# Patient Record
Sex: MALE | Race: WHITE | Employment: PART TIME | URBAN - METROPOLITAN AREA
[De-identification: names, ages, dates, MRNs, and addresses within clinical notes are randomized per-mention and may not be internally consistent; named-entity substitution may affect disease eponyms.]

---

## 2020-10-19 ENCOUNTER — NURSE TRIAGE (OUTPATIENT)
Dept: NURSING | Facility: CLINIC | Age: 45
End: 2020-10-19

## 2020-10-19 ENCOUNTER — HOSPITAL ENCOUNTER (EMERGENCY)
Facility: CLINIC | Age: 45
Discharge: HOME | End: 2020-10-19
Attending: EMERGENCY MEDICINE | Admitting: EMERGENCY MEDICINE
Payer: MEDICARE

## 2020-10-19 ENCOUNTER — APPOINTMENT (OUTPATIENT)
Dept: ULTRASOUND IMAGING | Facility: CLINIC | Age: 45
End: 2020-10-19
Attending: EMERGENCY MEDICINE
Payer: MEDICARE

## 2020-10-19 VITALS
HEART RATE: 74 BPM | DIASTOLIC BLOOD PRESSURE: 92 MMHG | RESPIRATION RATE: 16 BRPM | SYSTOLIC BLOOD PRESSURE: 152 MMHG | OXYGEN SATURATION: 97 % | TEMPERATURE: 99.7 F

## 2020-10-19 DIAGNOSIS — N43.3 HYDROCELE, LEFT: ICD-10-CM

## 2020-10-19 LAB
ALBUMIN SERPL-MCNC: 3.9 G/DL (ref 3.4–5)
ALP SERPL-CCNC: 78 U/L (ref 40–150)
ALT SERPL W P-5'-P-CCNC: 30 U/L (ref 0–70)
ANION GAP SERPL CALCULATED.3IONS-SCNC: 4 MMOL/L (ref 3–14)
AST SERPL W P-5'-P-CCNC: 24 U/L (ref 0–45)
BASOPHILS # BLD AUTO: 0 10E9/L (ref 0–0.2)
BASOPHILS NFR BLD AUTO: 0.8 %
BILIRUB SERPL-MCNC: 0.3 MG/DL (ref 0.2–1.3)
BUN SERPL-MCNC: 10 MG/DL (ref 7–30)
CALCIUM SERPL-MCNC: 8.5 MG/DL (ref 8.5–10.1)
CHLORIDE SERPL-SCNC: 105 MMOL/L (ref 94–109)
CO2 SERPL-SCNC: 30 MMOL/L (ref 20–32)
CREAT SERPL-MCNC: 0.71 MG/DL (ref 0.66–1.25)
DIFFERENTIAL METHOD BLD: ABNORMAL
EOSINOPHIL # BLD AUTO: 0.1 10E9/L (ref 0–0.7)
EOSINOPHIL NFR BLD AUTO: 1.4 %
ERYTHROCYTE [DISTWIDTH] IN BLOOD BY AUTOMATED COUNT: 13.2 % (ref 10–15)
GFR SERPL CREATININE-BSD FRML MDRD: >90 ML/MIN/{1.73_M2}
GLUCOSE SERPL-MCNC: 102 MG/DL (ref 70–99)
HCT VFR BLD AUTO: 40.1 % (ref 40–53)
HGB BLD-MCNC: 12.3 G/DL (ref 13.3–17.7)
IMM GRANULOCYTES # BLD: 0 10E9/L (ref 0–0.4)
IMM GRANULOCYTES NFR BLD: 0.4 %
LYMPHOCYTES # BLD AUTO: 0.8 10E9/L (ref 0.8–5.3)
LYMPHOCYTES NFR BLD AUTO: 15.3 %
MCH RBC QN AUTO: 25.6 PG (ref 26.5–33)
MCHC RBC AUTO-ENTMCNC: 30.7 G/DL (ref 31.5–36.5)
MCV RBC AUTO: 83 FL (ref 78–100)
MONOCYTES # BLD AUTO: 0.3 10E9/L (ref 0–1.3)
MONOCYTES NFR BLD AUTO: 5.6 %
NEUTROPHILS # BLD AUTO: 4 10E9/L (ref 1.6–8.3)
NEUTROPHILS NFR BLD AUTO: 76.5 %
NRBC # BLD AUTO: 0 10*3/UL
NRBC BLD AUTO-RTO: 0 /100
PLATELET # BLD AUTO: 304 10E9/L (ref 150–450)
POTASSIUM SERPL-SCNC: 3.7 MMOL/L (ref 3.4–5.3)
PROT SERPL-MCNC: 7.9 G/DL (ref 6.8–8.8)
RBC # BLD AUTO: 4.81 10E12/L (ref 4.4–5.9)
SODIUM SERPL-SCNC: 139 MMOL/L (ref 133–144)
WBC # BLD AUTO: 5.2 10E9/L (ref 4–11)

## 2020-10-19 PROCEDURE — 76870 US EXAM SCROTUM: CPT

## 2020-10-19 PROCEDURE — 99284 EMERGENCY DEPT VISIT MOD MDM: CPT | Mod: 25

## 2020-10-19 PROCEDURE — 81001 URINALYSIS AUTO W/SCOPE: CPT | Performed by: EMERGENCY MEDICINE

## 2020-10-19 PROCEDURE — 85025 COMPLETE CBC W/AUTO DIFF WBC: CPT | Performed by: EMERGENCY MEDICINE

## 2020-10-19 PROCEDURE — 80053 COMPREHEN METABOLIC PANEL: CPT | Performed by: EMERGENCY MEDICINE

## 2020-10-19 PROCEDURE — 36415 COLL VENOUS BLD VENIPUNCTURE: CPT | Performed by: EMERGENCY MEDICINE

## 2020-10-19 ASSESSMENT — ENCOUNTER SYMPTOMS
DYSURIA: 0
FEVER: 0

## 2020-10-19 NOTE — TELEPHONE ENCOUNTER
RN triage  Testicle swollen for most of the year this year. Lives in [place] and is here in [place] visiting  One is normal in size but the other is the size of an orange  No sores or drainage, skin is some red and irritated, chafed like for the past week  No trouble with urination    Not really any pain but it does come and go  No fever    Gave disposition to be seen in ED now, as soon as his friend can take him  Patient stated understanding  Thais Knott RN  [facility] Nurse Advisor    Reason for Disposition    Scrotum is painful or tender to touch     Swelling to the size of an orange in one testicle.  Disposition to ED now, friend will drive him    Additional Information    Negative: Pain or burning with passing urine (urination) is main symptom    Negative: Pubic lice suspected    Negative: STD exposure and prevention, question about    Negative: Pain in scrotum or testicle is main symptom    Swollen scrotum OR lump in the scrotum/groin area    Negative: Rash or color change of scrotum BUT no swelling or pain    Negative: Swelling followed a genital injury    Negative: Pain in scrotum is main symptom    Protocols used: SCROTUM SWELLING-A-OH, PENIS AND SCROTUM SYMPTOMS-A-OH

## 2020-10-19 NOTE — ED TRIAGE NOTES
"Pt presents with right sided testicular swelling and pain that has been ongoing for a year, pt denies any recent changes to it stating that today he \"figured he'd just get it taken care of.\" Pt alert, oriented x3 ABCs intact  "

## 2020-10-19 NOTE — ED PROVIDER NOTES
History   Chief Complaint  testicular Swelling    The history is provided by the patient.      Anthony Chappell is a 45 year old male who presents for evaluation of right sided testicular swelling for the past year that has progressively increased in size. In the last couple of weeks he noticed some changes to the skin over the area. Although, the area is not painful. He denies any dysuria, fevers, or other infectious symptoms.  He has not seen anyone for the swelling.     Allergies  No Known Allergies    Medications  The patient is not currently taking any prescribed medications.    Past Medical History  History reviewed. No pertinent past medical history.     Past Surgical History   History reviewed.  No pertinent past surgical history.    Family History  History reviewed. No pertinent family history.    Social History  The patient was unaccompanied to the ED.    Review of Systems   Constitutional: Negative for fever.   Genitourinary: Negative for dysuria and testicular pain.        Positive for testicular swelling   All other systems reviewed and are negative.      Physical Exam     Patient Vitals for the past 24 hrs:   BP Temp Temp src Pulse Resp SpO2   10/19/20 1700 (!) 152/92 -- -- 74 -- --   10/19/20 1645 (!) 151/91 -- -- 74 -- 97 %   10/19/20 1640 -- -- -- -- -- 98 %   10/19/20 1635 -- -- -- -- -- 97 %   10/19/20 1630 (!) 154/96 -- -- 75 -- 97 %   10/19/20 1610 -- -- -- -- -- 94 %   10/19/20 1605 -- -- -- -- -- 97 %   10/19/20 1540 (!) 166/105 -- -- 75 -- --   10/19/20 1359 (!) 150/107 -- -- -- -- --   10/19/20 1358 -- 99.7  F (37.6  C) Temporal 96 16 94 %       Physical Exam  Constitutional:       Appearance: He is well-developed.   HENT:      Right Ear: External ear normal.      Left Ear: External ear normal.      Mouth/Throat:      Mouth: Mucous membranes are moist.      Pharynx: Oropharynx is clear. No oropharyngeal exudate.   Eyes:      General: No scleral icterus.     Conjunctiva/sclera: Conjunctivae  normal.      Pupils: Pupils are equal, round, and reactive to light.   Cardiovascular:      Rate and Rhythm: Normal rate and regular rhythm.      Heart sounds: Normal heart sounds. No murmur. No friction rub. No gallop.    Pulmonary:      Effort: Pulmonary effort is normal. No respiratory distress.      Breath sounds: Normal breath sounds. No wheezing or rales.   Abdominal:      General: Bowel sounds are normal. There is no distension.      Palpations: Abdomen is soft. There is no mass.      Tenderness: There is no abdominal tenderness.   Genitourinary:     Comments: Grossly enlarged right scrotal mass without tenderness. No overlying skin erythema. There is dry flaky skin overlying the mass. Left side WNL.  Musculoskeletal: Normal range of motion.   Skin:     General: Skin is warm and dry.      Findings: No rash.   Neurological:      Mental Status: He is alert.               Emergency Department Course   Imaging:  Radiology findings were communicated with the patient who voiced understanding of the findings.    US testicular and scrotum w doppler ltd:  IMPRESSION:  1.  Large right scrotal hydrocele that appears simple.  2.  Small left scrotal hydrocele.  3.  No testicular mass or acute testicular abnormality.  Readings per Radiology    Laboratory:  Laboratory findings were communicated with the patient who voiced understanding of the findings.    CBC: HGB 12.3 (L) o/w WNL (WBC 5.12, )  CMP: glucose 102 (H) o/w WNL (Creatinine 0.71)    UA with Microscopic: pH 7.5 (H) o/w WNL    Emergency Department Course:  Past medical records, nursing notes, and vitals reviewed.    1434 I physically examined the patient as documented above.    IV was inserted and blood was drawn for laboratory testing, results above.    The patient provided a urine sample here in the emergency department. This was sent for laboratory testing, findings above.    The patient was sent for radiographs while in the emergency department, results  above.     1629 I consulted with Dr. Ambriz of urology.     1700 I rechecked the patient and discussed the findings of their workup thus far.     Findings and plan explained to the Patient. Patient discharged home with instructions regarding supportive care, medications, and reasons to return. The importance of close follow-up was reviewed.     I personally reviewed the laboratory and imaging results with the Patient and answered all related questions prior to discharge.     Impression & Plan   Medical Decision Making:  Anthony Chappell presents with an enlarged right testicle that has been like that for a year. Patient has mild tenderness but no evidence of any abdominal pain, no drainage, and no other signs of infection. Labs and ultrasound were obtained and most likely this is a large hydrocele. I talked to Dr. Ambriz of urology and they will be getting him in this week to deal with this as it is starting to puss, show skin changes, and the size does need to be dealt with. Patient is comfortable with the plan. Patient will follow-up with Dr. Ambriz as indicated.     Diagnosis:    ICD-10-CM    1. Hydrocele, left  N43.3        Disposition:  Discharged to home.    Scribe Disclosure:  I, Elly Rosario, am serving as a scribe at 2:33 PM on 10/19/2020 to document services personally performed by Evan Rogers MD based on my observations and the provider's statements to me.      Evan Rogers MD  10/19/20 0592

## 2020-10-19 NOTE — ED AVS SNAPSHOT
[facility] Emergency Dept  [address]  [facility] [address]  Phone: [phone]  Fax: [phone]                                    Anthony Chappell   MRN: [id number]    Department: [facility] Emergency Dept   Date of Visit: 10/19/2020           After Visit Summary Signature Page    I have received my discharge instructions, and my questions have been answered. I have discussed any challenges I see with this plan with the nurse or doctor.    ..........................................................................................................................................  Patient/Patient Representative Signature      ..........................................................................................................................................  Patient Representative Print Name and Relationship to Patient    ..................................................               ................................................  Date                                   Time    ..........................................................................................................................................  Reviewed by Signature/Title    ...................................................              ..............................................  Date                                               Time          22EPIC Rev 08/18

## 2020-10-20 LAB
ALBUMIN UR-MCNC: NEGATIVE MG/DL
APPEARANCE UR: ABNORMAL
BILIRUB UR QL STRIP: NEGATIVE
COLOR UR AUTO: ABNORMAL
GLUCOSE UR STRIP-MCNC: NEGATIVE MG/DL
HGB UR QL STRIP: NEGATIVE
KETONES UR STRIP-MCNC: NEGATIVE MG/DL
LEUKOCYTE ESTERASE UR QL STRIP: NEGATIVE
MUCOUS THREADS #/AREA URNS LPF: PRESENT /LPF
NITRATE UR QL: NEGATIVE
PH UR STRIP: 7.5 PH (ref 5–7)
RBC #/AREA URNS AUTO: 1 /HPF (ref 0–2)
SOURCE: ABNORMAL
SP GR UR STRIP: 1.02 (ref 1–1.03)
UROBILINOGEN UR STRIP-MCNC: NORMAL MG/DL (ref 0–2)
WBC #/AREA URNS AUTO: <1 /HPF (ref 0–5)

## 2020-11-05 ENCOUNTER — OFFICE VISIT (OUTPATIENT)
Dept: INTERNAL MEDICINE | Facility: CLINIC | Age: 45
End: 2020-11-05
Payer: MEDICARE

## 2020-11-05 VITALS
TEMPERATURE: 98 F | HEART RATE: 91 BPM | RESPIRATION RATE: 16 BRPM | DIASTOLIC BLOOD PRESSURE: 108 MMHG | OXYGEN SATURATION: 98 % | SYSTOLIC BLOOD PRESSURE: 148 MMHG | HEIGHT: 70 IN | WEIGHT: 259.8 LBS | BODY MASS INDEX: 37.19 KG/M2

## 2020-11-05 DIAGNOSIS — Q74.3 ARTHROGRYPOSIS MULTIPLEX CONGENITA: ICD-10-CM

## 2020-11-05 DIAGNOSIS — F19.20 CHEMICAL DEPENDENCY (H): Primary | ICD-10-CM

## 2020-11-05 DIAGNOSIS — I10 ESSENTIAL HYPERTENSION: ICD-10-CM

## 2020-11-05 LAB
ERYTHROCYTE [DISTWIDTH] IN BLOOD BY AUTOMATED COUNT: 13.9 % (ref 10–15)
HCT VFR BLD AUTO: 39.3 % (ref 40–53)
HGB BLD-MCNC: 12.2 G/DL (ref 13.3–17.7)
MCH RBC QN AUTO: 25.3 PG (ref 26.5–33)
MCHC RBC AUTO-ENTMCNC: 31 G/DL (ref 31.5–36.5)
MCV RBC AUTO: 81 FL (ref 78–100)
PLATELET # BLD AUTO: 282 10E9/L (ref 150–450)
RBC # BLD AUTO: 4.83 10E12/L (ref 4.4–5.9)
WBC # BLD AUTO: 4.5 10E9/L (ref 4–11)

## 2020-11-05 PROCEDURE — 85027 COMPLETE CBC AUTOMATED: CPT | Performed by: NURSE PRACTITIONER

## 2020-11-05 PROCEDURE — 80048 BASIC METABOLIC PNL TOTAL CA: CPT | Performed by: NURSE PRACTITIONER

## 2020-11-05 PROCEDURE — 99203 OFFICE O/P NEW LOW 30 MIN: CPT | Performed by: NURSE PRACTITIONER

## 2020-11-05 RX ORDER — LISINOPRIL 20 MG/1
20 TABLET ORAL DAILY
Qty: 90 TABLET | Refills: 1 | Status: SHIPPED | OUTPATIENT
Start: 2020-11-05

## 2020-11-05 ASSESSMENT — MIFFLIN-ST. JEOR: SCORE: 2074.7

## 2020-11-05 NOTE — LETTER
November 10, 2020      Anthony M Ta  [address]  [facility] [address]        Dear ,    We are writing to inform you of your test results.    Your glucose is elevated at 107 (normal <100) but not high enough to make you a diabetic. But it does indicate you are at high risk for developing diabetes mellitus. Exercise, avoiding simple carbohydrates in your diet and wt loss will all help to lower this risk. Please work on these as you can and I recommend rechecking fasting blood sugar in 6 months.  Your have mild anemia, I recommend a daily multivitamin with iron.     Resulted Orders   CBC with platelets   Result Value Ref Range    WBC 4.5 4.0 - 11.0 10e9/L    RBC Count 4.83 4.4 - 5.9 10e12/L    Hemoglobin 12.2 (L) 13.3 - 17.7 g/dL    Hematocrit 39.3 (L) 40.0 - 53.0 %    MCV 81 78 - 100 fl    MCH 25.3 (L) 26.5 - 33.0 pg    MCHC 31.0 (L) 31.5 - 36.5 g/dL      Comment:      Reviewed: OK with previous    RDW 13.9 10.0 - 15.0 %    Platelet Count 282 150 - 450 10e9/L   Basic metabolic panel   Result Value Ref Range    Sodium 139 133 - 144 mmol/L    Potassium 3.4 3.4 - 5.3 mmol/L    Chloride 106 94 - 109 mmol/L    Carbon Dioxide 27 20 - 32 mmol/L    Anion Gap 6 3 - 14 mmol/L    Glucose 107 (H) 70 - 99 mg/dL      Comment:      Non Fasting    Urea Nitrogen 9 7 - 30 mg/dL    Creatinine 0.61 (L) 0.66 - 1.25 mg/dL    GFR Estimate >90 >60 mL/min/[1.73_m2]      Comment:      Non  GFR Calc  Starting 12/18/2018, serum creatinine based estimated GFR (eGFR) will be   calculated using the Chronic Kidney Disease Epidemiology Collaboration   (CKD-EPI) equation.      GFR Estimate If Black >90 >60 mL/min/[1.73_m2]      Comment:       GFR Calc  Starting 12/18/2018, serum creatinine based estimated GFR (eGFR) will be   calculated using the Chronic Kidney Disease Epidemiology Collaboration   (CKD-EPI) equation.      Calcium 8.8 8.5 - 10.1 mg/dL       If you have any questions or concerns,  please call the clinic at the number listed above.       Sincerely,        Earlene Domingo NP

## 2020-11-05 NOTE — PROGRESS NOTES
"Subjective     Anthony Chappell is a 45 year old male who presents to clinic today for the following health issues:    HPI         New Patient/Transfer of Care    Patient Active Problem List   Diagnosis     Arthrogryposis multiplex congenita     Chemical dependency (H)     Essential hypertension     Current Outpatient Medications   Medication     lisinopril (ZESTRIL) 20 MG tablet     No current facility-administered medications for this visit.      Pt is interested in CD evaluation and treatment.  Currently daily use heroin         Review of Systems   Constitutional, HEENT, cardiovascular, pulmonary, gi and gu systems are negative, except as otherwise noted.      Objective    BP (!) 148/108 (BP Location: Right arm, Patient Position: Sitting, Cuff Size: Adult Regular)   Pulse 91   Temp 98  F (36.7  C) (Oral)   Resp 16   Ht 1.778 m (5' 10\")   Wt 117.8 kg (259 lb 12.8 oz)   SpO2 98%   BMI 37.28 kg/m    Body mass index is 37.28 kg/m .  Physical Exam   GENERAL: alert, no distress and physical deformities all limbs            Assessment & Plan     Chemical dependency (H)    - MENTAL HEALTH REFERRAL  - Adult; Assessments and Testing; MH/CD Assessment Center - Assess & Treat; Mental and Chemical Health Evaluation - determine appropriate level of care and admit to program; FV: [facility] [phone]; We will contac...    Essential hypertension    - lisinopril (ZESTRIL) 20 MG tablet; Take 1 tablet (20 mg) by mouth daily  - CBC with platelets  - Basic metabolic panel    Arthrogryposis multiplex congenita         BMI:   Estimated body mass index is 37.28 kg/m  as calculated from the following:    Height as of this encounter: 1.778 m (5' 10\").    Weight as of this encounter: 117.8 kg (259 lb 12.8 oz).            CD eval pending  Labs and BP med f/u in 1 month.    Earlene Domingo NP  [facility]    "

## 2020-11-05 NOTE — NURSING NOTE
"patient wants to talk about getting off of drugs.  bilateral leg swelling.  Vital signs:  Temp: 98  F (36.7  C) Temp src: Oral BP: (!) 148/108 Pulse: 91   Resp: 16 SpO2: 98 %     Height: 177.8 cm (5' 10\") Weight: 117.8 kg (259 lb 12.8 oz)  Estimated body mass index is 37.28 kg/m  as calculated from the following:    Height as of this encounter: 1.778 m (5' 10\").    Weight as of this encounter: 117.8 kg (259 lb 12.8 oz).        "

## 2020-11-06 LAB
ANION GAP SERPL CALCULATED.3IONS-SCNC: 6 MMOL/L (ref 3–14)
BUN SERPL-MCNC: 9 MG/DL (ref 7–30)
CALCIUM SERPL-MCNC: 8.8 MG/DL (ref 8.5–10.1)
CHLORIDE SERPL-SCNC: 106 MMOL/L (ref 94–109)
CO2 SERPL-SCNC: 27 MMOL/L (ref 20–32)
CREAT SERPL-MCNC: 0.61 MG/DL (ref 0.66–1.25)
GFR SERPL CREATININE-BSD FRML MDRD: >90 ML/MIN/{1.73_M2}
GLUCOSE SERPL-MCNC: 107 MG/DL (ref 70–99)
POTASSIUM SERPL-SCNC: 3.4 MMOL/L (ref 3.4–5.3)
SODIUM SERPL-SCNC: 139 MMOL/L (ref 133–144)

## 2020-11-20 ENCOUNTER — OFFICE VISIT (OUTPATIENT)
Dept: UROLOGY | Facility: CLINIC | Age: 45
End: 2020-11-20
Payer: MEDICARE

## 2020-11-20 VITALS
WEIGHT: 255 LBS | SYSTOLIC BLOOD PRESSURE: 158 MMHG | DIASTOLIC BLOOD PRESSURE: 100 MMHG | BODY MASS INDEX: 36.51 KG/M2 | HEIGHT: 70 IN

## 2020-11-20 DIAGNOSIS — F11.10 OPIATE ABUSE, CONTINUOUS (H): ICD-10-CM

## 2020-11-20 DIAGNOSIS — E66.01 MORBID OBESITY (H): ICD-10-CM

## 2020-11-20 DIAGNOSIS — N43.3 HYDROCELE IN ADULT: Primary | ICD-10-CM

## 2020-11-20 PROCEDURE — 99204 OFFICE O/P NEW MOD 45 MIN: CPT | Performed by: UROLOGY

## 2020-11-20 ASSESSMENT — MIFFLIN-ST. JEOR: SCORE: 2052.92

## 2020-11-20 ASSESSMENT — PAIN SCALES - GENERAL: PAINLEVEL: NO PAIN (0)

## 2020-11-20 NOTE — LETTER
Date:November 24, 2020      Patient was self referred, no letter generated. Do not send.        [facility] Health Information

## 2020-11-20 NOTE — LETTER
11/20/2020       RE: Anthony Chappell  [address] [facility] [address]     Dear Colleague,    Thank you for referring your patient, Anthony Chappell, to the [facility] [place] [facility]. Please see a copy of my visit note below.    [facility]  Main Office: [address]  [address]  [place] [address]       CHIEF COMPLAINT:  Bilateral hydroceles    HISTORY:   I was asked by Dr Evan Rogers to see this 45-year-old gentleman who was in the emergency department 1 month ago complaining of testicular swelling.  He was diagnosed with bilateral hydroceles, right greater than left.  The testicles are normal on ultrasound.  He reports that this hydrocele has been present for years but has been slowly growing.  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  He says that it is bothersome and he wishes to have it removed.    His other stated concern to me was that he is a current heroin user.  He said this made him concerned about surgery and postoperative course as well.  He saw Earlenecarroll Domingo on November 5 and was given a referral to mental health/chemical dependency.  He states that he thinks he called the number he was supposed to call.  He says that he spoke with told him that he needs to go back to the emergency department.  I informed him that, unless there is some form emergency, I did not think that was the correct course of action.      PAST MEDICAL HISTORY:   Past Medical History:   Diagnosis Date     Arthrogryposis multiplex congenita        PAST SURGICAL HISTORY:   Past Surgical History:   Procedure Laterality Date     CA ANESTH,LOWER ARM SURGERY Bilateral      LEG SURGERY Bilateral        FAMILY HISTORY:   Family History   Problem Relation Age of Onset     Cancer Mother         stomach       SOCIAL HISTORY:   Social History     Tobacco Use     Smoking status: Never Smoker     Smokeless  tobacco: Never Used   Substance Use Topics     Alcohol use: Not Currently        No Known Allergies      Current Outpatient Medications:      lisinopril (ZESTRIL) 20 MG tablet, Take 1 tablet (20 mg) by mouth daily, Disp: 90 tablet, Rfl: 1    Review Of Systems:  Skin: No rash, pruritis, or skin pigmentation  Eyes: No changes in vision  Ears/Nose/Throat: No changes in hearing, no nosebleeds  Respiratory: No shortness of breath, dyspnea on exertion, cough, or hemoptysis  Cardiovascular: No chest pain or palpitations  Gastrointestinal: No diarrhea or constipation. No abdominal pain. No hematochezia  Genitourinary: see HPI  Musculoskeletal: No pain or swelling of joints, normal range of motion  Neurologic: No weakness or tremors  Psychiatric: No recent changes in memory or mood  Hematologic/Lymphatic/Immunologic: No easy bruising or enlarged lymph nodes  Endocrine: No weight gain or loss      PHYSICAL EXAM:    There were no vitals taken for this visit.  General appearance: In NAD, conversant  HEENT: Normocephalic and atraumatic, anicteric sclera.  The pupils have normal accommodation and dilation.  No narrowing of the pupils consistent with active opiate use.  Cardiovascular: No peripheral edema  Respiratory: normal, non-labored breathing  Gastrointestinal: negative, Abdomen soft, non-tender, and non-distended.   Musculoskeletal: Normal musculature and movements  Peripheral Vascular/extremity: No peripheral edema  Skin: Normal temperature, turgor, and texture. No rash  Psychiatric: Appropriate affect, alert and oriented to person, place, and time    On genitourinary examination he has a very large right-sided hydrocele.  The left testicle is palpable and normal.  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  Lymphatic: Normal inguinal lymph nodes      Cystoscopy: Not done      PSA:     UA RESULTS:  Recent Labs   Lab Test 10/19/20  1527   COLOR Light Yellow   APPEARANCE Slightly Cloudy   URINEGLC Negative   URINEBILI  Negative   URINEKETONE Negative   SG 1.018   UBLD Negative   URINEPH 7.5*   PROTEIN Negative   NITRITE Negative   LEUKEST Negative   RBCU 1   WBCU <1       Bladder Scan:     Other Labs:      Imaging Studies: None      CLINICAL IMPRESSION:   Large right hydrocele, chemical dependency    PLAN:   He has a very large right hydrocele present.  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  He was reassured that this is a benign entity.  However, given the size of the hydrocele and its resulting in a buried penis he does wish to proceed with hydrocele repair in the operating room.  We discussed the surgery in detail today along with its risks expected recovery.  He wishes to proceed.  Given his history of active opiate and heroin use he certainly would not want to use opiates after his surgery.  I informed him that there would be discomfort after surgery but he can likely treat this adequately with Tylenol ibuprofen and icing.    Warm [place], I informed the patient that he should be off of opiates completely before proceeding with surgery.  There is not certain about which direction to go after he was not able to get appointment for chemical dependency.  Therefore with the patient in clinic today I called the [phone] number that was on referral from Earlene Domingo and I got him an appointment for Monday at 1 PM for chemical dependency intake.  I made certain and watched his smart phone as he received the appropriate text message explaining how to set up my chart and now to prepare for the appointment.  He understand that he is supposed to get a phone call on Monday to prep him for the appointment.  I gave him the number to call if there are any problems.  I did everything possible to make certain he  has a successful intake on Monday.    I instructed Anthony to contact the clinic once he has been off of opiates completely for at least 1 month.  We can then discuss proceeding with the surgery.      Luciano Ambriz MD        Again,  thank you for allowing me to participate in the care of your patient.      Sincerely,    uLciano Ambriz MD

## 2020-11-20 NOTE — PROGRESS NOTES
[facility]  Main Office: [address]  [address]  [place] [address]       CHIEF COMPLAINT:  Bilateral hydroceles    HISTORY:   I was asked by Dr Evan Rogers to see this 45-year-old gentleman who was in the emergency department 1 month ago complaining of testicular swelling.  He was diagnosed with bilateral hydroceles, right greater than left.  The testicles are normal on ultrasound.  He reports that this hydrocele has been present for years but has been slowly growing.  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  He says that it is bothersome and he wishes to have it removed.    His other stated concern to me was that he is a current heroin user.  He said this made him concerned about surgery and postoperative course as well.  He saw Earlene Domingo on November 5 and was given a referral to mental health/chemical dependency.  He states that he thinks he called the number he was supposed to call.  He says that he spoke with told him that he needs to go back to the emergency department.  I informed him that, unless there is some form emergency, I did not think that was the correct course of action.      PAST MEDICAL HISTORY:   Past Medical History:   Diagnosis Date     Arthrogryposis multiplex congenita        PAST SURGICAL HISTORY:   Past Surgical History:   Procedure Laterality Date     [place] ANESTH,LOWER ARM SURGERY Bilateral      LEG SURGERY Bilateral        FAMILY HISTORY:   Family History   Problem Relation Age of Onset     Cancer Mother         stomach       SOCIAL HISTORY:   Social History     Tobacco Use     Smoking status: Never Smoker     Smokeless tobacco: Never Used   Substance Use Topics     Alcohol use: Not Currently        No Known Allergies      Current Outpatient Medications:      lisinopril (ZESTRIL) 20 MG tablet, Take 1 tablet (20 mg) by mouth daily, Disp: 90 tablet, Rfl: 1    Review Of Systems:  Skin: No rash, pruritis, or skin pigmentation  Eyes:  No changes in vision  Ears/Nose/Throat: No changes in hearing, no nosebleeds  Respiratory: No shortness of breath, dyspnea on exertion, cough, or hemoptysis  Cardiovascular: No chest pain or palpitations  Gastrointestinal: No diarrhea or constipation. No abdominal pain. No hematochezia  Genitourinary: see HPI  Musculoskeletal: No pain or swelling of joints, normal range of motion  Neurologic: No weakness or tremors  Psychiatric: No recent changes in memory or mood  Hematologic/Lymphatic/Immunologic: No easy bruising or enlarged lymph nodes  Endocrine: No weight gain or loss      PHYSICAL EXAM:    There were no vitals taken for this visit.  General appearance: In NAD, conversant  HEENT: Normocephalic and atraumatic, anicteric sclera.  The pupils have normal accommodation and dilation.  No narrowing of the pupils consistent with active opiate use.  Cardiovascular: No peripheral edema  Respiratory: normal, non-labored breathing  Gastrointestinal: negative, Abdomen soft, non-tender, and non-distended.   Musculoskeletal: Normal musculature and movements  Peripheral Vascular/extremity: No peripheral edema  Skin: Normal temperature, turgor, and texture. No rash  Psychiatric: Appropriate affect, alert and oriented to person, place, and time    On genitourinary examination he has a very large right-sided hydrocele.  The left testicle is palpable and normal.  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  Lymphatic: Normal inguinal lymph nodes      Cystoscopy: Not done      PSA:     UA RESULTS:  Recent Labs   Lab Test 10/19/20  1527   COLOR Light Yellow   APPEARANCE Slightly Cloudy   URINEGLC Negative   URINEBILI Negative   URINEKETONE Negative   SG 1.018   UBLD Negative   URINEPH 7.5*   PROTEIN Negative   NITRITE Negative   LEUKEST Negative   RBCU 1   WBCU <1       Bladder Scan:     Other Labs:      Imaging Studies: None      CLINICAL IMPRESSION:   Large right hydrocele, chemical dependency    PLAN:   He has a very large  right hydrocele present.  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  He was reassured that this is a benign entity.  However, given the size of the hydrocele and its resulting in a buried penis he does wish to proceed with hydrocele repair in the operating room.  We discussed the surgery in detail today along with its risks expected recovery.  He wishes to proceed.  Given his history of active opiate and heroin use he certainly would not want to use opiates after his surgery.  I informed him that there would be discomfort after surgery but he can likely treat this adequately with Tylenol ibuprofen and icing.    Warm Mercedez, I informed the patient that he should be off of opiates completely before proceeding with surgery.  There is not certain about which direction to go after he was not able to get appointment for chemical dependency.  Therefore with the patient in clinic today I called the [phone] number that was on referral from Earlene Domingo and I got him an appointment for Monday at 1 PM for chemical dependency intake.  I made certain and watched his smart phone as he received the appropriate text message explaining how to set up my chart and now to prepare for the appointment.  He understand that he is supposed to get a phone call on Monday to prep him for the appointment.  I gave him the number to call if there are any problems.  I did everything possible to make certain he  has a successful intake on Monday.    I instructed Anthony to contact the clinic once he has been off of opiates completely for at least 1 month.  We can then discuss proceeding with the surgery.      Luciano Ambriz MD

## 2020-11-30 ENCOUNTER — HOSPITAL ENCOUNTER (OUTPATIENT)
Dept: BEHAVIORAL HEALTH | Facility: CLINIC | Age: 45
Discharge: HOME | End: 2020-11-30
Attending: FAMILY MEDICINE | Admitting: FAMILY MEDICINE
Payer: MEDICARE

## 2020-11-30 ENCOUNTER — OFFICE VISIT (OUTPATIENT)
Dept: BEHAVIORAL HEALTH | Facility: CLINIC | Age: 45
End: 2020-11-30
Payer: MEDICARE

## 2020-11-30 VITALS
SYSTOLIC BLOOD PRESSURE: 148 MMHG | TEMPERATURE: 98 F | RESPIRATION RATE: 20 BRPM | WEIGHT: 237.7 LBS | DIASTOLIC BLOOD PRESSURE: 100 MMHG | BODY MASS INDEX: 34.11 KG/M2 | HEART RATE: 70 BPM

## 2020-11-30 DIAGNOSIS — F11.20 OPIOID USE DISORDER, SEVERE, DEPENDENCE (H): Primary | ICD-10-CM

## 2020-11-30 PROCEDURE — 99215 OFFICE O/P EST HI 40 MIN: CPT | Performed by: FAMILY MEDICINE

## 2020-11-30 PROCEDURE — 90791 PSYCH DIAGNOSTIC EVALUATION: CPT | Mod: GT | Performed by: COUNSELOR

## 2020-11-30 PROCEDURE — 99207 PR CDG-CODE CATEGORY CHANGED: CPT | Performed by: FAMILY MEDICINE

## 2020-11-30 RX ORDER — HYDROXYZINE PAMOATE 25 MG/1
25-50 CAPSULE ORAL 3 TIMES DAILY PRN
Qty: 30 CAPSULE | Refills: 0 | Status: SHIPPED | OUTPATIENT
Start: 2020-11-30 | End: 2021-01-18

## 2020-11-30 RX ORDER — BUPRENORPHINE AND NALOXONE 8; 2 MG/1; MG/1
2 FILM, SOLUBLE BUCCAL; SUBLINGUAL DAILY
Qty: 14 FILM | Refills: 0 | Status: SHIPPED | OUTPATIENT
Start: 2020-11-30 | End: 2021-01-18

## 2020-11-30 RX ORDER — GABAPENTIN 300 MG/1
300 CAPSULE ORAL 3 TIMES DAILY PRN
Qty: 30 CAPSULE | Refills: 0 | Status: SHIPPED | OUTPATIENT
Start: 2020-11-30 | End: 2021-01-18

## 2020-11-30 RX ORDER — ONDANSETRON 8 MG/1
8 TABLET, ORALLY DISINTEGRATING ORAL EVERY 8 HOURS PRN
Qty: 30 TABLET | Refills: 0 | Status: SHIPPED | OUTPATIENT
Start: 2020-11-30 | End: 2021-01-18

## 2020-11-30 RX ORDER — CLONIDINE HYDROCHLORIDE 0.1 MG/1
0.1 TABLET ORAL 4 TIMES DAILY PRN
Qty: 30 TABLET | Refills: 0 | Status: SHIPPED | OUTPATIENT
Start: 2020-11-30 | End: 2021-01-18

## 2020-11-30 ASSESSMENT — COLUMBIA-SUICIDE SEVERITY RATING SCALE - C-SSRS
6. HAVE YOU EVER DONE ANYTHING, STARTED TO DO ANYTHING, OR PREPARED TO DO ANYTHING TO END YOUR LIFE?: NO
4. HAVE YOU HAD THESE THOUGHTS AND HAD SOME INTENTION OF ACTING ON THEM?: NO
ATTEMPT LIFETIME: NO
TOTAL  NUMBER OF ABORTED OR SELF INTERRUPTED ATTEMPTS PAST LIFETIME: NO
3. HAVE YOU BEEN THINKING ABOUT HOW YOU MIGHT KILL YOURSELF?: NO
1. IN THE PAST MONTH, HAVE YOU WISHED YOU WERE DEAD OR WISHED YOU COULD GO TO SLEEP AND NOT WAKE UP?: NO
4. HAVE YOU HAD THESE THOUGHTS AND HAD SOME INTENTION OF ACTING ON THEM?: NO
2. HAVE YOU ACTUALLY HAD ANY THOUGHTS OF KILLING YOURSELF LIFETIME?: NO
1. IN THE PAST MONTH, HAVE YOU WISHED YOU WERE DEAD OR WISHED YOU COULD GO TO SLEEP AND NOT WAKE UP?: NO
6. HAVE YOU EVER DONE ANYTHING, STARTED TO DO ANYTHING, OR PREPARED TO DO ANYTHING TO END YOUR LIFE?: NO
5. HAVE YOU STARTED TO WORK OUT OR WORKED OUT THE DETAILS OF HOW TO KILL YOURSELF? DO YOU INTEND TO CARRY OUT THIS PLAN?: NO
ATTEMPT PAST THREE MONTHS: NO
TOTAL  NUMBER OF INTERRUPTED ATTEMPTS PAST 3 MONTHS: NO
TOTAL  NUMBER OF INTERRUPTED ATTEMPTS LIFETIME: NO
TOTAL  NUMBER OF ABORTED OR SELF INTERRUPTED ATTEMPTS PAST 3 MONTHS: NO
2. HAVE YOU ACTUALLY HAD ANY THOUGHTS OF KILLING YOURSELF?: NO
5. HAVE YOU STARTED TO WORK OUT OR WORKED OUT THE DETAILS OF HOW TO KILL YOURSELF? DO YOU INTEND TO CARRY OUT THIS PLAN?: NO

## 2020-11-30 NOTE — PROGRESS NOTES
Anthony Chappell is a 45 year old male with PMH arthrogryposis multiplex congenita, HTN, and opioid use disorder who is being seen in the [facility] today for initial evaluation.  Pt was referred by Earlene Domingo NP.    Pt reports most recent use consisting primarily of heroin by insufflation, 2g/day, 7days/week, for the last 5 years, most recent use 11/29/20 ~8pm.  Pt reports his use of opioids began approximately 2005 initially with prescription opioids.      Pt states they are seeking treatment at this time because of his desire to have hydrocele corrected and concerns about anesthetic interactions with heroin; he states he is also tired of having to use to avoid withdrawal.    Treatments they have tried in the past include buprenorphine not prescribed one time in 9/2020.    History of overdose: denies  History of IV use: denies  HCV/HIV status/screening: does not recall  Current withdrawal symptoms described by patient include mild anxiety and restlessness.              Additional Substance Use/Behavioral Addiction Histories:  Opioids: as above    Alcohol: denies    Nicotine: denies    Stimulants: denies    Benzodiazepines:  None in the last 2 months; states prior to that he was taking benzodiazepines a few times per week for sleep    Cannabis: denies    Hallucinogens: denies    Behavioral addictions (disordered eating, gambling, shopping, sex, internet:) denies      DSM 5 OUD Criteria:  Taken in larger amounts/greater time spent in behavior over longer period of time than intended,: yes    Persistent desire or unsuccessful efforts to cut down or control use/behavior: yes    A great deal of time is spent in activities necessary to obtain the substance/participate in the behavior or recover from its effects: yes    Cravings: yes    Recurrent use/behavior resulting in failure to fulfill major role obligations at work, school, or home: no    Continued use/behavior despite having persistent or recurrent  social or interpersonal problems caused or exacerbated by effects of use/behavior: no    Important social, occupational, or recreational activities are given up or reduced because of use/behavior: no    Recurrent use/behavior in situations in which it is physically hazardous: yes    Continued use/behavior despite knowledge of having a persistent or recurrent physical or psychological problem that is likely to have been caused or exacerbated by use/behavior: yes    Tolerance: yes    Withdrawal: yes      Past Medical History:   Diagnosis Date     Arthrogryposis multiplex congenita     Underwent multiple orthopedic surgical procedures from birth to age 8      There is no history of suicidal ideation/suicide attempt.    There is no history of seizures.        Medications:       lisinopril (ZESTRIL) 20 MG tablet, Take 1 tablet (20 mg) by mouth daily    No current facility-administered medications on file prior to visit.         No Known Allergies     Past Surgical History:   Procedure Laterality Date     [place] ANESTH,LOWER ARM SURGERY Bilateral      LEG SURGERY Bilateral        Family History   Problem Relation Age of Onset     Cancer Mother         stomach        Social History     Social History Narrative    Moved from [place] to [place] 10/2020    Living with his cousin    Has a     Not currently employed; has worked as a  for [employer] in the past    Heterosexual, not in a relationship, no children          ROS: 10 point ROS neg other than the symptoms noted above in the HPI.       Physical Exam:  BP (!) 148/100 (BP Location: Left arm)   Pulse 70   Temp 98  F (36.7  C)   Resp 20   Wt 107.8 kg (237 lb 11.2 oz)   BMI 34.11 kg/m      Physical Exam  Constitutional:       Appearance: He is overweight.   HENT:      Head: Normocephalic and atraumatic.   Pulmonary:      Effort: Pulmonary effort is normal.   Musculoskeletal:      Right elbow: He exhibits deformity.      Left elbow: He exhibits deformity.      Right  wrist: He exhibits deformity.      Left wrist: He exhibits deformity.   Neurological:      Mental Status: He is alert and oriented to person, place, and time.      Coordination: Coordination is intact.   Psychiatric:         Attention and Perception: Attention and perception normal.         Mood and Affect: Mood is anxious. Affect is flat.         Speech: Speech normal.         Behavior: Behavior is cooperative.         Thought Content: Thought content normal.         Cognition and Memory: Cognition normal.      Comments: Insight and judgement are fair        Labs:  10/19/20:  CMP normal/acceptable    11/05/20:  CBC normal except hgb 12.2/hct 39.3     Pt declined additional testing today including screening for HCV and HIV            Assessment/Plan:  1. Opioid use disorder, severe, dependence (H)  Pt describing last use 11/29/20 ~8pm.   He has had one experience of precipitated withdrawal when he attempted to take buprenorphine 9/2020; this was at 25 hours from last use.  Pt was using a different product at that time.   Discussed attempting to start home buprenorphine induction at 24 hrs from last use with 2mg dose buprenorphine.   Reviewed directions below regarding progression of dose and/or use of additional medications prescribed for opioid withdrawal symptoms.   Pt declined additional baseline labs today  Pt was unable to urinate for UDS today.   Reviewed recommendations for continued engagement in medical treatment of OUD and potential benefits associated with this; pt is currently not of the opinion he would like to remain on buprenorphine.   Reviewed option of long acting injectable buprenorphine; pt is not interested in that form of treatment at this time.   Reviewed options for psychosocial treatment in addition to medical therapy; pt is not interested at this time.    Pt agreeable to follow up within one week; video f/u appt scheduled for 12/4/20 at 2:30p.  Notify medical staff sooner if problems.   -  buprenorphine HCl-naloxone HCl (SUBOXONE) 8-2 MG per film; Place 2 Film under the tongue daily  Dispense: 14 Film; Refill: 0  - cloNIDine (CATAPRES) 0.1 MG tablet; Take 1 tablet (0.1 mg) by mouth 4 times daily as needed (withdrawal symptoms)  Dispense: 30 tablet; Refill: 0  - ondansetron (ZOFRAN-ODT) 8 MG ODT tab; Take 1 tablet (8 mg) by mouth every 8 hours as needed for nausea  Dispense: 30 tablet; Refill: 0  - gabapentin (NEURONTIN) 300 MG capsule; Take 1 capsule (300 mg) by mouth 3 times daily as needed (withdrawal symptoms)  Dispense: 30 capsule; Refill: 0  - hydrOXYzine (VISTARIL) 25 MG capsule; Take 1-2 capsules (25-50 mg) by mouth 3 times daily as needed for anxiety (insomnia)  Dispense: 30 capsule; Refill: 0  - naloxone (NARCAN) 4 MG/0.1ML nasal spray; Spray 1 spray (4 mg) into one nostril alternating nostrils as needed for opioid reversal every 2-3 minutes until assistance arrives  Dispense: 0.2 mL; Refill: 11          Patient Instructions   When it has been at least 24 hours from your last use of other opioids, start buprenorphine with a 2mg dose (1/4 of 8mg/2mg film.)    In 30 minutes, if withdrawal symptoms are not worse, take the remaining 6mg (3/4 of 8mg/2mg film.)  Day 2 until next visit: take 16mg buprenorphine (OK to take 8mg twice daily.)      If withdrawal symptoms do worsen after initial 2mg dose of buprenorphine, do not take additional buprenorphine on day 1, and use other medications prescribed for treating withdrawal symptoms.   Re-try starting buprenorphine again in 24 hours.            Total visit time 45 minutes, >50% of visit spent in counseling and coordination of care regarding recommendations, appropriate use and side effects of medication(s,) risk of overdose, self care, follow up plan.        JAMI PEGUERO MD on 11/30/2020 at 12:32 PM     [facility]

## 2020-11-30 NOTE — NURSING NOTE
Reason for visit: New patient. Interested in Suboxone. Tried it once before and had precipitated withdrawal    BP (!) 148/100 (BP Location: Left arm)   Pulse 70   Temp 98  F (36.7  C)   Resp 20   Wt 107.8 kg (237 lb 11.2 oz)   BMI 34.11 kg/m      [place] : reviewed; no data found    Unable to urinate due to hydrocele. Has to have someone assist him with catheter to urinate.    Last substance use, if any: yesterday, snorted heroin.   No hx of IV drug use.    Does patient have prescription for Narcan? No    Vandana Chapman RN on 11/30/2020 at 11:45 AM

## 2020-11-30 NOTE — PROGRESS NOTES
"[facility]  Provider Name:  Myrna Boogie, LOIDA, [facility], [facility]    PATIENT'S NAME: Anthony Chappell  PREFERRED NAME: Anthony  PRONOUNS:     He/him  MRN: [id number]  : 1975   ACCT. NUMBER:  [id number]  DATE OF SERVICE: 20  START TIME: 8:03am  END TIME: 8:40am  PREFERRED PHONE:  [phone]  [email or web address]   May we leave a program related message: Yes  SERVICE MODALITY:  Video Visit:      Provider verified identity through the following two step process.  Patient provided:  Patient     Telemedicine Visit: The patient's condition can be safely assessed and treated via synchronous audio and visual telemedicine encounter.      Reason for Telemedicine Visit: Services only offered telehealth - Covid    Originating Site (Patient Location): Patient's home    Distant Site (Provider Location): Provider Remote Setting    Consent:  The patient/guardian has verbally consented to: the potential risks and benefits of telemedicine (video visit) versus in person care; bill my insurance or make self-payment for services provided; and responsibility for payment of non-covered services.     Patient would like the video invitation sent by: Send to e-mail at: [email or web address]    Mode of Communication:  Video Conference via [facility]    As the provider I attest to compliance with applicable laws and regulations related to telemedicine.    UNIVERSAL ADULT Substance Use Disorder DIAGNOSTIC ASSESSMENT    Identifying Information:  Patient is a 45 year old.  The pronoun use throughout this assessment reflects the patient's chosen pronoun.  Patient was referred for an assessment by primary care provider.  Patient attended the session alone.     Chief Complaint:   The reason for seeking services at this time is: \"get off of heroin.\" He has tried to get sober alone. He is not in to \"group stuff\" and stated he is having hard time talking to . He wants medications only, then " go to a hotel room, and go through it on his own.    The problem(s) began 16 years ago, after his mother  of cancer. Patient does not appear to be in severe withdrawal, an imminent safety risk to self or others, or requiring immediate medical attention and may proceed with the assessment interview.    Social/Family History:  Patient reported they grew up in [place]. His parents  at age 8. He has one younger brother and a younger half-sister. Patient lived with his mother after the divorce. His father remarried when patient was age 9 and he has two stepsisters. His mother  of cancer 16 years ago. He started drinking all the time and it really bothered him. Patient reported that their childhood was good. He denied all forms of abuse, but he would hit with a belt for discipline. He stated his parents were friendly after the divorce. Patient describes current relationships with family of origin as good.     The patient describes their cultural background as White and grew up mainly around White kids and [place] kids and some Black kids. He has never been Congregational.  Cultural influences and impact on patient's life structure, values, norms, and healthcare: None.  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 Patient identified their preferred language to be English. Patient reported they does not need the assistance of an  or other support involved in therapy.        Patient reported had no significant delays in developmental tasks. Patient's highest education level was high school graduate and college for a few semesters. Patient identified the following learning problems: none reported.  Modifications will not be used to assist communication in therapy. Patient reports they are  able to understand written materials.    Patient reported the following relationship history as never .  Patient's  current relationship status is single. Patient identified their sexual orientation as heterosexual.  Patient reported having zero child(eyal).     Patient moved to [place] a few months ago, in order to get away from drug contacts. His cousin has lived in [place] for the past 10 years and is . Patient stated he stays between his cousin's place and a hotel. The [place] address on file is the hotel. Patient stated he gets heroin from someone his cousin knows. His cousin wants him to stop heroin use and is supportive of him. His cousin may drink here and there. Housing is somewhat stable. Patient identified cousin as part of their support system.  Patient identified the quality of these relationships as good. He stated he keeps to himself. His father knows that he used to smoke pot as a kid, but his father doesn't know about the heroin use.      Patient is disabled. Patient reports their finances are obtained through disability. He stated his grandfather  and left him money, and his uncle  when he was kid, so he had money then. But his drug habit is expensive. Patient does identify finances as a current stressor - disability doesn't pay much. 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      Patient reported that they have been involved with the legal system. At age 19, he was arrested with a little bag of pot, but released. He keeps to himself and doesn't run the streeets. He hasn't been homeless or had other legal problems.     Patient's Strengths and Limitations:  Patient identified the following strengths or resources that will help them succeed in treatment: commitment to health and well being, insight, intelligence and strong social skills, nice person, get along with everyone. Things that may interfere with the patient's success in treatment include: few friends, financial hardship, lack of family support, transportation concerns and housing instability.     Personal and Family Medical History:    Patient does not report a family history of mental health concerns.  Patient reports family history includes Cancer in his mother.     Patient reported the following previous diagnoses which include(s): none reported.  Patient has not received mental health services in the past. Psychiatric Hospitalizations: None.  Patient denies a history of civil commitment. Arthrotyposis. Surgeries as a kid. He doesn't go to the hospital. Currently, patient is not receiving other mental health services.     Patient has had a physical exam to rule out medical causes for current symptoms.  Date of last physical exam was within the past year. Symptoms have developed since last physical exam and client was encouraged to follow up with PCP.  . The patient has a [facility] Provider Earlene Dawson. Patient reports the following current medical concerns: high blood pressure and overweight.  There are not significant appetite / nutritional concerns / weight changes - unless when he goes through withdrawal.   Patient does not report a history of head injury / trauma / cognitive impairment.      Current Outpatient Medications   Medication     lisinopril (ZESTRIL) 20 MG tablet     Medication Adherence:  Patient reports taking prescribed medications as prescribed.    Patient Allergies:  No Known Allergies    Medical History:    Past Medical History:   Diagnosis Date     Arthrogryposis multiplex congenita      Current Mental Status Exam:   Appearance:  Appropriate    Eye Contact:  Good   Psychomotor:  Normal       Gait / station:  Seated  Attitude / Demeanor: Cooperative   Speech      Rate / Production: Normal/ Responsive      Volume:  Normal  volume      Language:  intact  Mood:   Normal  Affect:   Appropriate    Thought Content: Clear   Thought Process: Coherent       Associations: No loosening of associations  Insight:   Fair   Judgment:  Intact   Orientation:  All  Attention/concentration: Good    Rating Scales:    PHQ9:   "  PHQ-9 SCORE 11/29/2020   PHQ-9 Total Score Lauriehart 4 (Minimal depression)   PHQ-9 Total Score 4   ;    GAD7:    MARGARITA-7 SCORE 11/29/2020   Total Score 1 (minimal anxiety)   Total Score 1     Clinical Global Impressions  First:  Considering your total clinical experience with this particular patient population, how severe are the patient's symptoms at this time?: 5 (11/30/2020  8:30 AM)  Most recent:  Compared to the patient's condition at the START of treatment, this patient's condition is: 4 (11/30/2020  8:30 AM)    Substance Use:  Patient did not report a family history of substance use concerns; see medical history section for details.  Patient has not received chemical dependency treatment in the past.  Patient has not ever been to detox. He has tried to get off of heroin by himself. He made it a week and the physical withdrawals were gone, but he was wiped out and did not sleep for a week. Patient is not currently receiving any chemical dependency treatment. Patient reported the following problems as a result of their substance use: financial problems, daily use and cravings/urges to use.    Patient first used alcohol as a kid. Alcohol has not been a problem. In the past 10 years, he has only had a few drinks.   Patient denies using tobacco.  Patient first used marijuana at age 17 and smoked daily and also on and off until age 26. Current Use: once per year. He quit marijuana when he started using heroin.   Patient drinks coffee every few days.   Patient first used OxyContin in 2005.  He stated he wasn't addicted to it and used it when he wanted to have fun.   Patient started taking Methadone about 10 years in 2009 or 2010 and used for a few years. He got it from a friend. He found a Methadone clinic, but their rules were crazy and getting on the streets was easier. He stated he has had \"easy access\" to opioids for his whole life. Last use of Methadone: 5 years ago.    Patient first used heroin about 5 years. " He sniffs a couple of grams daily and uses multiple times per day. He primarily uses by himself. Last Use: Yesterday.  He identified withdrawal symptoms of: anxiety, nausea, diarrhea, crazy insomnia, no appetite, can't sit still. He denied IV use.   Patient tried Suboxone once and it kicked him into withdrawal. He had waited 24 hours, but realized it was still too soon.   He has tried MDMA twice in life in his 20s.   He has tried benzos, meth, and cocaine in the past, and denied addiction or ongoing concerns.   Patient reported no other substance use.     CAGE-AID Total Score 2020   Total Score 4     Based on the positive CAGE score and clinical interview there  are indications of drug or alcohol abuse. Diagnostic assessment for substance use disorder completed. Therapist did recommend client to reduce use or abstain from alcohol or substance use.    Significant Losses / Trauma / Abuse / Neglect Issues:   Patient did not serve in the . Concerns for possible neglect are not present. There are indications or report of significant loss, trauma, abuse or neglect issues: patient's mother  of cancer 16 years ago and patient started abusing drugs.     Safety Assessment:   Current Safety Concerns:  [place] Suicide Severity Rating (Short Version) 10/19/2020 2020   Over the past 2 weeks have you felt down, depressed, or hopeless? no no   Over the past 2 weeks have you had thoughts of killing yourself? no no   Have you ever attempted to kill yourself? no no     Patient denies current homicidal ideation and behaviors.  Patient denies current self-injurious ideation and behaviors. He denied current suicidal ideation, plan, and intent. He denied past suicide attempts and self-injurious behavior.   Patient denied risk behaviors associated with substance use.  Patient denies any high risk behaviors associated with mental health symptoms.  Patient reports the following current concerns for their personal  safety: None.  Patient reports there are  firearms in the house in [place], but not in [place].     History of Safety Concerns:   Patient denied a history of homicidal ideation.     Patient denied a history of personal safety concerns.    Patient denied a history of assaultive behaviors.    Patient denied a history of sexual assault behaviors.     Patient reported a history of placing themselves in unsafe environment(s) associated with substance use.  Patient denies any history of high risk behaviors associated with mental health symptoms.  Patient reports the following protective factors: sense of meaning and positive social skills    Risk Plan:  See Recommendations for Safety and Risk Management Plan    Review of Symptoms per patient report:  Depression: Change in appetite  Hattie:  No Symptoms  Psychosis: No Symptoms  Anxiety: No Symptoms  Panic:  No symptoms  Post Traumatic Stress Disorder:  No Symptoms   Eating Disorder: No Symptoms  ADD / ADHD:  No symptoms  Conduct Disorder: No symptoms  Autism Spectrum Disorder: No symptoms  Obsessive Compulsive Disorder: No Symptoms    Patient reports the following compulsive behaviors and treatment history: None. He gambles sometimes, no problems.     Diagnostic Criteria:   OP BEH CROW CRITERIA: Substance is often taken in larger amounts or over a longer period than was intended.  Met for:  Opiates, There is persistent desire or unsuccessful efforts to cut down or control use of the substance.  Met for:  Opiates,  A great deal of time is spent in activities necessary to obtain the substance, use the substance, or recover from its effects.  Met for:  Opiates, Craving, or a strong desire or urge to use the substance.  Met for:  Opiates, Recurrent use of the substance resulting in a failure to fulfill major role obligations at work, school, or home.  Met for:  Opiates, Continued use of the substance despite having persistent or recurrent social or interpersonal problems caused or  exacerbated by the effects of its use.  Met for:  Opiates, Important social, occupational, or recreational activities are given up or reduced because of the substance.  Met for:  Opiates, Recurrent use of the substance in which it is physically hazardous.  Met for:  Opiates, Use of the substance is continued despite knowledge of having a persistent or recurrent physical or psychological problem that is likely to have been cause or exacerbated by the substance.  Met for:  Opiates, Tolerance:  either a need for markedly increased amounts of the substance to achieve the desired effect or a markedly diminished effect with continued use of the dame amount of the substance.  Met for:  Opiates, Withdrawal:  either patient endorses characteristic withdrawal syndrome for the substance or the substance (or closely related substance) is taken to relieve or avoid withdrawal symptoms.  Met for:  Opiates    Functional Status:  Patient reports the following functional impairments: health maintenance and self-care.      WHODAS 2.0 Total Score 11/29/2020   Total Score 13   Total Score MyChart 13       Clinical Summary:  1. Reason for assessment: patient wants medication for heroin withdrawal  2. Psychosocial, Cultural and Contextual Factors: None identified  3. Principal DSM5 Diagnoses  (Sustained by DSM5 Criteria Listed Above):   Substance-Related & Addictive Disorders Opioid Use Disorder 304.00 (F11.20) Severe    4. Other Diagnoses that is relevant to services:     5. Provisional Diagnosis:   None   6. Prognosis: Relieve Acute Symptoms and Maintain Current Status / Prevent Deterioration  7. Likely consequences of symptoms if not treated: Patient may need higher level of care  8. Client strengths include:  insightful, intelligent and motivated    Recommendations:     1. Plan for Safety and Risk Management:   Recommended that patient call 911 or go to the local ED should there be a change in any of these risk factors. Report to  child / adult protection services was NA.     2. Patient did not identify concerns with a cultural influence.     3. Initial Treatment will focus on: Alcohol / Substance Use - Medication-Assisted Treatment.     4. Resources/Service Plan:    services are not indicated.   Modifications to assist communication are not indicated.   Additional disability accommodations are not indicated.      5. Collaboration:  Collaboration / coordination of treatment will be initiated with the following support professionals: None      6.  Referrals:   The following referral(s) will be initiated: [facility]. Next Scheduled Appointment:  informed patient of the walk-in hours 9am to 3pm on M,W,F.    7. CROW: Recommendations: Obtain medication assisted treatment, abstain from heroin, other opiates, and other mood-altering substances. Patient would also benefit from individual therapy to address grief/loss of his mother.      8. Records were reviewed at time of assessment. Information in this assessment was obtained from the medical record and provided by patient who is a good historian. Patient will have open access to their mental health medical record.    Provider Name/ Credentials:  LOIDA Mcduffie, TERESA, EBONY  November 30, 2020

## 2020-11-30 NOTE — PATIENT INSTRUCTIONS
When it has been at least 24 hours from your last use of other opioids, start buprenorphine with a 2mg dose (1/4 of 8mg/2mg film.)    In 30 minutes, if withdrawal symptoms are not worse, take the remaining 6mg (3/4 of 8mg/2mg film.)  Day 2 until next visit: take 16mg buprenorphine (OK to take 8mg twice daily.)      If withdrawal symptoms do worsen after initial 2mg dose of buprenorphine, do not take additional buprenorphine on day 1, and use other medications prescribed for treating withdrawal symptoms.   Re-try starting buprenorphine again in 24 hours.

## 2020-12-04 ENCOUNTER — TELEPHONE (OUTPATIENT)
Dept: BEHAVIORAL HEALTH | Facility: CLINIC | Age: 45
End: 2020-12-04

## 2020-12-04 NOTE — TELEPHONE ENCOUNTER
Reached out to Anthony by phone and left VM saying to [facility]    .   [facility]  [address], [address]   [place], [place], [address]  Phone: [phone]  Fax: [phone]    Open Mon, Wed, Fridays  9:00am-4:00pm  Walk in hours: 9am-3pm

## 2020-12-07 NOTE — TELEPHONE ENCOUNTER
Left message with patient to reschedule.    [facility]  [address], [address]   [place], [place], [address]  Phone: [phone]  Fax: [phone]    Open Mon, Wed, Fridays  9:00am-4:00pm  Walk in hours: 9am-3pm

## 2020-12-23 ENCOUNTER — TELEPHONE (OUTPATIENT)
Dept: BEHAVIORAL HEALTH | Facility: CLINIC | Age: 45
End: 2020-12-23

## 2020-12-23 ENCOUNTER — MYC REFILL (OUTPATIENT)
Dept: BEHAVIORAL HEALTH | Facility: CLINIC | Age: 45
End: 2020-12-23

## 2020-12-23 DIAGNOSIS — F11.20 OPIOID USE DISORDER, SEVERE, DEPENDENCE (H): ICD-10-CM

## 2020-12-23 RX ORDER — BUPRENORPHINE AND NALOXONE 8; 2 MG/1; MG/1
2 FILM, SOLUBLE BUCCAL; SUBLINGUAL DAILY
Qty: 14 FILM | Refills: 0 | Status: CANCELLED | OUTPATIENT
Start: 2020-12-23

## 2020-12-23 NOTE — TELEPHONE ENCOUNTER
Pt called my office phone regarding buprenorphine treatment.  He states did eventually start buprenorphine 10 days ago as discussed at his initial visit on 11/30/20.  He reports precipitated withdrawal initially, having started 28 hours from last use.  He was eventually able to continue buprenorphine and has utilized his last film.      He was agreeable to an in person appointment for reevaluation today.  This was scheduled.

## 2020-12-28 ENCOUNTER — TELEPHONE (OUTPATIENT)
Dept: BEHAVIORAL HEALTH | Facility: CLINIC | Age: 45
End: 2020-12-28

## 2020-12-28 NOTE — TELEPHONE ENCOUNTER
I phoned this pt on 12/23/20 the day he called for buprenorphine rx, agreed to same day appointment, then stated he had to cancel due to lack of transportation.  He reported he had traveled out of state, then returned to [place] and wanted to resume buprenorphine tx  In the process of finding another appointment time that would work for him, he stated he needed to take another call, then disconnected from our call.  He was not able to be reached following this.      Please contact pt to reschedule an appointment.  In person is first recommendation, but video appt is acceptable if unable to come in person .

## 2020-12-31 ENCOUNTER — TELEPHONE (OUTPATIENT)
Dept: BEHAVIORAL HEALTH | Facility: CLINIC | Age: 45
End: 2020-12-31

## 2020-12-31 NOTE — TELEPHONE ENCOUNTER
Spoke with Anthony about the importance of recovery groups, support and continuing to connect with others. He states that he no longer wants to take suboxone because he does not want to be addicted to that. He has not taken it for 6 days and feels great. We discussed getting back on it if he ever feels like he is going to relapse and wants assistance. We plan on going to meet at a coffee on Tuesday to talk more about recovery.

## 2021-01-09 ENCOUNTER — HEALTH MAINTENANCE LETTER (OUTPATIENT)
Age: 46
End: 2021-01-09

## 2021-01-12 DIAGNOSIS — Z11.59 ENCOUNTER FOR SCREENING FOR OTHER VIRAL DISEASES: Primary | ICD-10-CM

## 2021-01-18 ENCOUNTER — OFFICE VISIT (OUTPATIENT)
Dept: INTERNAL MEDICINE | Facility: CLINIC | Age: 46
End: 2021-01-18
Payer: MEDICARE

## 2021-01-18 ENCOUNTER — TELEPHONE (OUTPATIENT)
Dept: INTERNAL MEDICINE | Facility: CLINIC | Age: 46
End: 2021-01-18

## 2021-01-18 VITALS
OXYGEN SATURATION: 95 % | BODY MASS INDEX: 30.38 KG/M2 | DIASTOLIC BLOOD PRESSURE: 70 MMHG | RESPIRATION RATE: 12 BRPM | TEMPERATURE: 99.1 F | HEART RATE: 150 BPM | WEIGHT: 217 LBS | SYSTOLIC BLOOD PRESSURE: 120 MMHG | HEIGHT: 71 IN

## 2021-01-18 DIAGNOSIS — Z01.818 PREOP GENERAL PHYSICAL EXAM: Primary | ICD-10-CM

## 2021-01-18 DIAGNOSIS — F19.20 CHEMICAL DEPENDENCY (H): ICD-10-CM

## 2021-01-18 LAB
AMPHETAMINES UR QL: NOT DETECTED NG/ML
BARBITURATES UR QL SCN: NOT DETECTED NG/ML
BENZODIAZ UR QL SCN: ABNORMAL NG/ML
BUPRENORPHINE UR QL: NOT DETECTED NG/ML
CANNABINOIDS UR QL: NOT DETECTED NG/ML
COCAINE UR QL SCN: NOT DETECTED NG/ML
D-METHAMPHET UR QL: NOT DETECTED NG/ML
ERYTHROCYTE [DISTWIDTH] IN BLOOD BY AUTOMATED COUNT: 15.4 % (ref 10–15)
HCT VFR BLD AUTO: 45.1 % (ref 40–53)
HGB BLD-MCNC: 14.4 G/DL (ref 13.3–17.7)
MCH RBC QN AUTO: 24.9 PG (ref 26.5–33)
MCHC RBC AUTO-ENTMCNC: 31.9 G/DL (ref 31.5–36.5)
MCV RBC AUTO: 78 FL (ref 78–100)
METHADONE UR QL SCN: NOT DETECTED NG/ML
OPIATES UR QL SCN: NOT DETECTED NG/ML
OXYCODONE UR QL SCN: NOT DETECTED NG/ML
PCP UR QL SCN: NOT DETECTED NG/ML
PLATELET # BLD AUTO: 450 10E9/L (ref 150–450)
PROPOXYPH UR QL: NOT DETECTED NG/ML
RBC # BLD AUTO: 5.78 10E12/L (ref 4.4–5.9)
TRICYCLICS UR QL SCN: NOT DETECTED NG/ML
WBC # BLD AUTO: 8.8 10E9/L (ref 4–11)

## 2021-01-18 PROCEDURE — 85027 COMPLETE CBC AUTOMATED: CPT | Performed by: NURSE PRACTITIONER

## 2021-01-18 PROCEDURE — 80048 BASIC METABOLIC PNL TOTAL CA: CPT | Performed by: NURSE PRACTITIONER

## 2021-01-18 PROCEDURE — 80306 DRUG TEST PRSMV INSTRMNT: CPT | Performed by: NURSE PRACTITIONER

## 2021-01-18 PROCEDURE — 36415 COLL VENOUS BLD VENIPUNCTURE: CPT | Performed by: NURSE PRACTITIONER

## 2021-01-18 PROCEDURE — 99214 OFFICE O/P EST MOD 30 MIN: CPT | Performed by: NURSE PRACTITIONER

## 2021-01-18 ASSESSMENT — MIFFLIN-ST. JEOR: SCORE: 1896.44

## 2021-01-18 NOTE — TELEPHONE ENCOUNTER
Please contact Dr Canchola F u[facility] [place] that pt preop drug screen was positive for benzodiazepines.Does he want to go forward with surgery, hydrocele repair?  Earlene Domingo CNP

## 2021-01-18 NOTE — H&P (VIEW-ONLY)
[place] [address]  [facility] [address]  Phone: [phone]  Primary Provider: No Ref-Primary, Physician      PREOPERATIVE EVALUATION:  Today's date: 1/18/2021    Anthony Chappell is a 45 year old male who presents for a preoperative evaluation.    Surgical Information:  Surgery/Procedure: Right hydrocelectomy  Surgery Location: [facility]  Surgeon: Dr Canchola  Surgery Date: 1/25/21  Time of Surgery: 1230pm  Where patient plans to recover: At home with family  Fax number for surgical facility: Note does not need to be faxed, will be available electronically in Epic.    Type of Anesthesia Anticipated: General    Subjective     HPI related to upcoming procedure:  R hydrocelectomy    1. No - Have you ever had a heart attack or stroke?  2. No - Have you ever had surgery on your heart or blood vessels, such as a stent, coronary (heart) bypass, or surgery on an artery in the head, neck, heart, or legs?  3. No - Do you have chest pain when you are physically active?  4. No - Do you have a history of heart failure?  5. No - Do you currently have a cold, bronchitis, or symptoms of other respiratory (head and chest) infections?  6. No - Do you have a cough, shortness of breath, or wheezing?  7. No - Do you or anyone in your family have a history of blood clots?  8. No - Do you or anyone in your family have a serious bleeding problem, such as long-lasting bleeding after surgeries or cuts?  9. No - Have you ever had anemia or been told to take iron pills?  10. No - Have you had any abnormal blood loss such as black, tarry or bloody stools, or abnormal vaginal bleeding?  11. No - Have you ever had a blood transfusion?  12. Yes - Are you willing to have a blood transfusion if it is medically needed before, during, or after your surgery?  13. No - Have you or anyone in your family ever had problems with anesthesia (sedation for surgery)?  14. No - Do you have sleep apnea, excessive snoring,  or daytime drowsiness?   15. No - Do you have any artifical heart valves or other implanted medical devices, such as a pacemaker, defibrillator, or continuous glucose monitor?  16. No - Do you have any artifical joints?  17. No - Are you allergic to latex?  18. No - Is there any chance that you may be pregnant?  Health Care Directive:  Patient does not have a Health Care Directive or Living Will: Discussed advance care planning with patient; however, patient declined at this time.    Preoperative Review of :   reviewed - no record of controlled substances prescribed.          Review of Systems  CONSTITUTIONAL: NEGATIVE for fever, chills, change in weight  ENT/MOUTH: NEGATIVE for ear, mouth and throat problems  RESP: NEGATIVE for significant cough or SOB  CV: NEGATIVE for chest pain, palpitations or peripheral edema  GI: NEGATIVE for nausea, abdominal pain, heartburn, or change in bowel habits  : NEGATIVE for frequency, dysuria, or hematuria  MUSCULOSKELETAL: NEGATIVE for significant arthralgias or myalgia  NEURO: NEGATIVE for weakness, dizziness or paresthesias  ENDOCRINE: NEGATIVE for temperature intolerance, skin/hair changes  HEME: NEGATIVE for bleeding problems  PSYCHIATRIC: NEGATIVE for changes in mood or affect    Pt states he is not taking opiates or illicit drugs     Patient Active Problem List    Diagnosis Date Noted     Morbid obesity (H) 11/20/2020     Priority: Medium     Chemical dependency (H) 11/05/2020     Priority: Medium     Essential hypertension 11/05/2020     Priority: Medium     Arthrogryposis multiplex congenita      Priority: Medium      Past Medical History:   Diagnosis Date     Arthrogryposis multiplex congenita     Underwent multiple orthopedic surgical procedures from birth to age 8     HTN (hypertension)     started lisinopril 11/5/20     Past Surgical History:   Procedure Laterality Date     CA ANESTH,LOWER ARM SURGERY Bilateral      LEG SURGERY Bilateral      Current Outpatient  "Medications   Medication Sig Dispense Refill     lisinopril (ZESTRIL) 20 MG tablet Take 1 tablet (20 mg) by mouth daily 90 tablet 1       No Known Allergies     Social History     Tobacco Use     Smoking status: Never Smoker     Smokeless tobacco: Never Used   Substance Use Topics     Alcohol use: Not Currently       History   Drug Use     Types: Opiates     Comment: heroin, last use yesterday 8pm         Objective     /70   Pulse 150   Temp 99.1  F (37.3  C) (Oral)   Resp 12   Ht 1.803 m (5' 11\")   Wt 98.4 kg (217 lb)   SpO2 95%   BMI 30.27 kg/m        Physical Exam    GENERAL APPEARANCE: healthy, alert and no distress     EYES: EOMI,  PERRL     NECK: no adenopathy, no asymmetry, masses, or scars and thyroid normal to palpation     RESP: lungs clear to auscultation - no rales, rhonchi or wheezes     CV: regular rates and rhythm, normal S1 S2, no S3 or S4 and no murmur, click or rub     ABDOMEN:  soft, nontender, no HSM or masses and bowel sounds normal     MS: BUE shortened and wrist contractures     SKIN: no suspicious lesions or rashes     NEURO: Normal strength and tone, sensory exam grossly normal, mentation intact and speech normal     PSYCH: mentation appears normal. and affect normal/bright     LYMPHATICS: No cervical adenopathy    Recent Labs   Lab Test 11/05/20  1542 10/19/20  1615   HGB 12.2* 12.3*    304    139   POTASSIUM 3.4 3.7   CR 0.61* 0.71        Diagnostics:  Labs WNL, except benzodiazapine detected in urine sample  Surgeon was notified of positive drug screen and will f/u with pt  Medical clearance given for surgery  No EKG required, no history of coronary heart disease, significant arrhythmia, peripheral arterial disease or other structural heart disease.    Revised Cardiac Risk Index (RCRI):  The patient has the following serious cardiovascular risks for perioperative complications:   - No serious cardiac risks = 0 points     RCRI Interpretation: 0 points: Class I " (very low risk - 0.4% complication rate)           Assessment & Plan   The proposed surgical procedure is considered INTERMEDIATE risk.      (Z01.818) Preop general physical exam  (primary encounter diagnosis)  Comment:   Plan: CBC with platelets, Basic metabolic panel            (F19.20) Chemical dependency (H)  Comment:   Plan: Drug Abuse Screen Panel 13, Urine (Pain Care         Package)                Risks and Recommendations:  The patient has the following additional risks and recommendations for perioperative complications:   - No identified additional risk factors other than previously addressed        RECOMMENDATION:  APPROVAL GIVEN to proceed with proposed procedure pending review of diagnostic evaluation.    Signed Electronically by: Earlene Domingo NP    Copy of this evaluation report is provided to requesting physician.    Preop [facility] Preop Guidelines    Revised Cardiac Risk Index

## 2021-01-18 NOTE — NURSING NOTE
"/70   Pulse 150   Temp 99.1  F (37.3  C) (Oral)   Resp 12   Ht 1.803 m (5' 11\")   Wt 98.4 kg (217 lb)   SpO2 95%   BMI 30.27 kg/m      "

## 2021-01-18 NOTE — PROGRESS NOTES
[place] [address]  [facility] [address]  Phone: [phone]  Primary Provider: No Ref-Primary, Physician      PREOPERATIVE EVALUATION:  Today's date: 1/18/2021    Anthony Chappell is a 45 year old male who presents for a preoperative evaluation.    Surgical Information:  Surgery/Procedure: Right hydrocelectomy  Surgery Location: [facility]  Surgeon: Dr Canchola  Surgery Date: 1/25/21  Time of Surgery: 1230pm  Where patient plans to recover: At home with family  Fax number for surgical facility: Note does not need to be faxed, will be available electronically in Epic.    Type of Anesthesia Anticipated: General    Subjective     HPI related to upcoming procedure:  R hydrocelectomy    1. No - Have you ever had a heart attack or stroke?  2. No - Have you ever had surgery on your heart or blood vessels, such as a stent, coronary (heart) bypass, or surgery on an artery in the head, neck, heart, or legs?  3. No - Do you have chest pain when you are physically active?  4. No - Do you have a history of heart failure?  5. No - Do you currently have a cold, bronchitis, or symptoms of other respiratory (head and chest) infections?  6. No - Do you have a cough, shortness of breath, or wheezing?  7. No - Do you or anyone in your family have a history of blood clots?  8. No - Do you or anyone in your family have a serious bleeding problem, such as long-lasting bleeding after surgeries or cuts?  9. No - Have you ever had anemia or been told to take iron pills?  10. No - Have you had any abnormal blood loss such as black, tarry or bloody stools, or abnormal vaginal bleeding?  11. No - Have you ever had a blood transfusion?  12. Yes - Are you willing to have a blood transfusion if it is medically needed before, during, or after your surgery?  13. No - Have you or anyone in your family ever had problems with anesthesia (sedation for surgery)?  14. No - Do you have sleep apnea, excessive snoring,  or daytime drowsiness?   15. No - Do you have any artifical heart valves or other implanted medical devices, such as a pacemaker, defibrillator, or continuous glucose monitor?  16. No - Do you have any artifical joints?  17. No - Are you allergic to latex?  18. No - Is there any chance that you may be pregnant?  Health Care Directive:  Patient does not have a Health Care Directive or Living Will: Discussed advance care planning with patient; however, patient declined at this time.    Preoperative Review of :   reviewed - no record of controlled substances prescribed.          Review of Systems  CONSTITUTIONAL: NEGATIVE for fever, chills, change in weight  ENT/MOUTH: NEGATIVE for ear, mouth and throat problems  RESP: NEGATIVE for significant cough or SOB  CV: NEGATIVE for chest pain, palpitations or peripheral edema  GI: NEGATIVE for nausea, abdominal pain, heartburn, or change in bowel habits  : NEGATIVE for frequency, dysuria, or hematuria  MUSCULOSKELETAL: NEGATIVE for significant arthralgias or myalgia  NEURO: NEGATIVE for weakness, dizziness or paresthesias  ENDOCRINE: NEGATIVE for temperature intolerance, skin/hair changes  HEME: NEGATIVE for bleeding problems  PSYCHIATRIC: NEGATIVE for changes in mood or affect    Pt states he is not taking opiates or illicit drugs     Patient Active Problem List    Diagnosis Date Noted     Morbid obesity (H) 11/20/2020     Priority: Medium     Chemical dependency (H) 11/05/2020     Priority: Medium     Essential hypertension 11/05/2020     Priority: Medium     Arthrogryposis multiplex congenita      Priority: Medium      Past Medical History:   Diagnosis Date     Arthrogryposis multiplex congenita     Underwent multiple orthopedic surgical procedures from birth to age 8     HTN (hypertension)     started lisinopril 11/5/20     Past Surgical History:   Procedure Laterality Date     CA ANESTH,LOWER ARM SURGERY Bilateral      LEG SURGERY Bilateral      Current Outpatient  "Medications   Medication Sig Dispense Refill     lisinopril (ZESTRIL) 20 MG tablet Take 1 tablet (20 mg) by mouth daily 90 tablet 1       No Known Allergies     Social History     Tobacco Use     Smoking status: Never Smoker     Smokeless tobacco: Never Used   Substance Use Topics     Alcohol use: Not Currently       History   Drug Use     Types: Opiates     Comment: heroin, last use yesterday 8pm         Objective     /70   Pulse 150   Temp 99.1  F (37.3  C) (Oral)   Resp 12   Ht 1.803 m (5' 11\")   Wt 98.4 kg (217 lb)   SpO2 95%   BMI 30.27 kg/m        Physical Exam    GENERAL APPEARANCE: healthy, alert and no distress     EYES: EOMI,  PERRL     NECK: no adenopathy, no asymmetry, masses, or scars and thyroid normal to palpation     RESP: lungs clear to auscultation - no rales, rhonchi or wheezes     CV: regular rates and rhythm, normal S1 S2, no S3 or S4 and no murmur, click or rub     ABDOMEN:  soft, nontender, no HSM or masses and bowel sounds normal     MS: BUE shortened and wrist contractures     SKIN: no suspicious lesions or rashes     NEURO: Normal strength and tone, sensory exam grossly normal, mentation intact and speech normal     PSYCH: mentation appears normal. and affect normal/bright     LYMPHATICS: No cervical adenopathy    Recent Labs   Lab Test 11/05/20  1542 10/19/20  1615   HGB 12.2* 12.3*    304    139   POTASSIUM 3.4 3.7   CR 0.61* 0.71        Diagnostics:  Labs WNL, except benzodiazapine detected in urine sample  Surgeon was notified of positive drug screen and will f/u with pt  Medical clearance given for surgery  No EKG required, no history of coronary heart disease, significant arrhythmia, peripheral arterial disease or other structural heart disease.    Revised Cardiac Risk Index (RCRI):  The patient has the following serious cardiovascular risks for perioperative complications:   - No serious cardiac risks = 0 points     RCRI Interpretation: 0 points: Class I " (very low risk - 0.4% complication rate)           Assessment & Plan   The proposed surgical procedure is considered INTERMEDIATE risk.      (Z01.818) Preop general physical exam  (primary encounter diagnosis)  Comment:   Plan: CBC with platelets, Basic metabolic panel            (F19.20) Chemical dependency (H)  Comment:   Plan: Drug Abuse Screen Panel 13, Urine (Pain Care         Package)                Risks and Recommendations:  The patient has the following additional risks and recommendations for perioperative complications:   - No identified additional risk factors other than previously addressed        RECOMMENDATION:  APPROVAL GIVEN to proceed with proposed procedure pending review of diagnostic evaluation.    Signed Electronically by: Earlene Domingo NP    Copy of this evaluation report is provided to requesting physician.    Preop [facility] Preop Guidelines    Revised Cardiac Risk Index

## 2021-01-19 LAB
ANION GAP SERPL CALCULATED.3IONS-SCNC: 6 MMOL/L (ref 3–14)
BUN SERPL-MCNC: 7 MG/DL (ref 7–30)
CALCIUM SERPL-MCNC: 9.2 MG/DL (ref 8.5–10.1)
CHLORIDE SERPL-SCNC: 108 MMOL/L (ref 94–109)
CO2 SERPL-SCNC: 26 MMOL/L (ref 20–32)
CREAT SERPL-MCNC: 0.92 MG/DL (ref 0.66–1.25)
GFR SERPL CREATININE-BSD FRML MDRD: >90 ML/MIN/{1.73_M2}
GLUCOSE SERPL-MCNC: 108 MG/DL (ref 70–99)
POTASSIUM SERPL-SCNC: 3.6 MMOL/L (ref 3.4–5.3)
SODIUM SERPL-SCNC: 140 MMOL/L (ref 133–144)

## 2021-01-19 NOTE — TELEPHONE ENCOUNTER
Allyson [phone] from Dr. Canchola's office called. She will ask the doctor about surgery and call back with his response.

## 2021-01-19 NOTE — TELEPHONE ENCOUNTER
Routed message in Epic to Dr. Canchola. Sat on hold for office ([phone]) for over ten minutes and then transferred to a phone that just rang and rang. Lashonda able to leave message with Joelle requesting a return call.

## 2021-01-19 NOTE — TELEPHONE ENCOUNTER
Eros Canchola MD Kohser, Sharon E, LPN   Caller: Unspecified (Yesterday,  4:18 PM)             Matthew Carr,     Sorry for the trouble with the phones. Not sure how you got that number for our group. That's an old [place] location that I've never practiced out of. Let's plan to proceed. I will talk to the patient to find out where that may have come from. We may end up canceling pending the result of that phone call but I will let you know.     Eros Canchola MD   [facility]   Pager: [phone]   Office: [phone]   Surgical [phone]

## 2021-01-21 ENCOUNTER — TELEPHONE (OUTPATIENT)
Dept: INTERNAL MEDICINE | Facility: CLINIC | Age: 46
End: 2021-01-21

## 2021-01-21 NOTE — TELEPHONE ENCOUNTER
Call from [facility] regarding patient's pre-op done 1/18/21, pre-op states:  RECOMMENDATION:  APPROVAL GIVEN to proceed with proposed procedure pending review of diagnostic evaluation.    Some labs came back abnormal, they are needing pre-op to be addend to state if patient is clear for surgery after review of labs. Does not need to be faxed as they are able to see update in Epic. Please advise,     Thank you

## 2021-01-22 ENCOUNTER — HOSPITAL ENCOUNTER (OUTPATIENT)
Dept: LAB | Facility: CLINIC | Age: 46
Discharge: HOME | End: 2021-01-22
Attending: UROLOGY | Admitting: UROLOGY
Payer: MEDICARE

## 2021-01-22 DIAGNOSIS — Z11.59 ENCOUNTER FOR SCREENING FOR OTHER VIRAL DISEASES: ICD-10-CM

## 2021-01-22 LAB
SARS-COV-2 RNA RESP QL NAA+PROBE: NORMAL
SPECIMEN SOURCE: NORMAL

## 2021-01-22 PROCEDURE — U0005 INFEC AGEN DETEC AMPLI PROBE: HCPCS | Performed by: UROLOGY

## 2021-01-22 PROCEDURE — U0003 INFECTIOUS AGENT DETECTION BY NUCLEIC ACID (DNA OR RNA); SEVERE ACUTE RESPIRATORY SYNDROME CORONAVIRUS 2 (SARS-COV-2) (CORONAVIRUS DISEASE [COVID-19]), AMPLIFIED PROBE TECHNIQUE, MAKING USE OF HIGH THROUGHPUT TECHNOLOGIES AS DESCRIBED BY CMS-2020-01-R: HCPCS | Performed by: UROLOGY

## 2021-01-22 NOTE — TELEPHONE ENCOUNTER
See the message from Dr Canchola.  was going to call pt to decide.   There is another encounter, from 1/18/21 addressing the abnormal results. Earlene is out of the office today.     Call to [facility] and advised. They will check with Dr Canchola to see if pt is cleared with him.   Advised her that Earlene cleared pt if Ok with surgeon.     Eros Cacnhola MD Kohser, Lilly MCQUEEN, LPN   Caller: Unspecified (Yesterday,  4:18 PM)              Matthew Carr,   Sorry for the trouble with the phones. Not sure how you got that number for our group. That's an old [facility] location that I've never practiced out of. Let's plan to proceed. I will talk to the patient to find out where that may have come from. We may end up canceling pending the result of that phone call but I will let you know.     Eros Canchola MD   [facility]

## 2021-01-22 NOTE — TELEPHONE ENCOUNTER
Naomi from [facility] surgery needs Pre-op from 1/18/21 to be addended and state that diagnostics tests have been review and either patient is cleared or not cleared for surgery. Call Naomi if you have questions at [phone]

## 2021-01-23 LAB
LABORATORY COMMENT REPORT: NORMAL
SARS-COV-2 RNA RESP QL NAA+PROBE: NEGATIVE
SPECIMEN SOURCE: NORMAL

## 2021-01-25 ENCOUNTER — ANESTHESIA (OUTPATIENT)
Dept: SURGERY | Facility: CLINIC | Age: 46
End: 2021-01-25
Payer: MEDICARE

## 2021-01-25 ENCOUNTER — HOSPITAL ENCOUNTER (OUTPATIENT)
Facility: CLINIC | Age: 46
Discharge: HOME | End: 2021-01-25
Attending: UROLOGY | Admitting: UROLOGY
Payer: MEDICARE

## 2021-01-25 ENCOUNTER — ANESTHESIA EVENT (OUTPATIENT)
Dept: SURGERY | Facility: CLINIC | Age: 46
End: 2021-01-25
Payer: MEDICARE

## 2021-01-25 VITALS
OXYGEN SATURATION: 96 % | RESPIRATION RATE: 16 BRPM | HEIGHT: 71 IN | BODY MASS INDEX: 30.69 KG/M2 | SYSTOLIC BLOOD PRESSURE: 136 MMHG | WEIGHT: 219.2 LBS | HEART RATE: 60 BPM | DIASTOLIC BLOOD PRESSURE: 99 MMHG | TEMPERATURE: 97.1 F

## 2021-01-25 DIAGNOSIS — N43.3 HYDROCELE, UNSPECIFIED HYDROCELE TYPE: Primary | ICD-10-CM

## 2021-01-25 LAB
AMPHETAMINES UR QL SCN: NEGATIVE
BARBITURATES UR QL: NEGATIVE
BENZODIAZ UR QL: NEGATIVE
CANNABINOIDS UR QL SCN: NEGATIVE
COCAINE UR QL: NEGATIVE
OPIATES UR QL SCN: NEGATIVE
PCP UR QL SCN: NEGATIVE

## 2021-01-25 PROCEDURE — 250N000013 HC RX MED GY IP 250 OP 250 PS 637: Performed by: UROLOGY

## 2021-01-25 PROCEDURE — 999N000141 HC STATISTIC PRE-PROCEDURE NURSING ASSESSMENT: Performed by: UROLOGY

## 2021-01-25 PROCEDURE — 272N000001 HC OR GENERAL SUPPLY STERILE: Performed by: UROLOGY

## 2021-01-25 PROCEDURE — 250N000011 HC RX IP 250 OP 636: Performed by: UROLOGY

## 2021-01-25 PROCEDURE — 370N000017 HC ANESTHESIA TECHNICAL FEE, PER MIN: Performed by: UROLOGY

## 2021-01-25 PROCEDURE — 250N000009 HC RX 250

## 2021-01-25 PROCEDURE — 250N000025 HC SEVOFLURANE, PER MIN: Performed by: UROLOGY

## 2021-01-25 PROCEDURE — 80307 DRUG TEST PRSMV CHEM ANLYZR: CPT | Performed by: UROLOGY

## 2021-01-25 PROCEDURE — 250N000009 HC RX 250: Performed by: NURSE ANESTHETIST, CERTIFIED REGISTERED

## 2021-01-25 PROCEDURE — 258N000003 HC RX IP 258 OP 636: Performed by: NURSE ANESTHETIST, CERTIFIED REGISTERED

## 2021-01-25 PROCEDURE — 250N000011 HC RX IP 250 OP 636: Performed by: PHYSICIAN ASSISTANT

## 2021-01-25 PROCEDURE — 250N000011 HC RX IP 250 OP 636: Performed by: NURSE ANESTHETIST, CERTIFIED REGISTERED

## 2021-01-25 PROCEDURE — 258N000003 HC RX IP 258 OP 636

## 2021-01-25 PROCEDURE — 360N000075 HC SURGERY LEVEL 2, PER MIN: Performed by: UROLOGY

## 2021-01-25 PROCEDURE — 250N000011 HC RX IP 250 OP 636

## 2021-01-25 PROCEDURE — 710N000012 HC RECOVERY PHASE 2, PER MINUTE: Performed by: UROLOGY

## 2021-01-25 PROCEDURE — 710N000009 HC RECOVERY PHASE 1, LEVEL 1, PER MIN: Performed by: UROLOGY

## 2021-01-25 RX ORDER — PROPOFOL 10 MG/ML
INJECTION, EMULSION INTRAVENOUS CONTINUOUS PRN
Status: DISCONTINUED | OUTPATIENT
Start: 2021-01-25 | End: 2021-01-25

## 2021-01-25 RX ORDER — PROPOFOL 10 MG/ML
INJECTION, EMULSION INTRAVENOUS PRN
Status: DISCONTINUED | OUTPATIENT
Start: 2021-01-25 | End: 2021-01-25

## 2021-01-25 RX ORDER — DEXAMETHASONE SODIUM PHOSPHATE 4 MG/ML
INJECTION, SOLUTION INTRA-ARTICULAR; INTRALESIONAL; INTRAMUSCULAR; INTRAVENOUS; SOFT TISSUE PRN
Status: DISCONTINUED | OUTPATIENT
Start: 2021-01-25 | End: 2021-01-25

## 2021-01-25 RX ORDER — HYDROCODONE BITARTRATE AND ACETAMINOPHEN 5; 325 MG/1; MG/1
2 TABLET ORAL
Status: COMPLETED | OUTPATIENT
Start: 2021-01-25 | End: 2021-01-25

## 2021-01-25 RX ORDER — HYDROCODONE BITARTRATE AND ACETAMINOPHEN 5; 325 MG/1; MG/1
1-2 TABLET ORAL EVERY 4 HOURS PRN
Qty: 20 TABLET | Refills: 0 | Status: SHIPPED | OUTPATIENT
Start: 2021-01-25

## 2021-01-25 RX ORDER — LIDOCAINE HYDROCHLORIDE 20 MG/ML
INJECTION, SOLUTION INFILTRATION; PERINEURAL PRN
Status: DISCONTINUED | OUTPATIENT
Start: 2021-01-25 | End: 2021-01-25

## 2021-01-25 RX ORDER — FENTANYL CITRATE 50 UG/ML
INJECTION, SOLUTION INTRAMUSCULAR; INTRAVENOUS PRN
Status: DISCONTINUED | OUTPATIENT
Start: 2021-01-25 | End: 2021-01-25

## 2021-01-25 RX ORDER — BUPIVACAINE HYDROCHLORIDE 2.5 MG/ML
INJECTION, SOLUTION INFILTRATION; PERINEURAL PRN
Status: DISCONTINUED | OUTPATIENT
Start: 2021-01-25 | End: 2021-01-25 | Stop reason: HOSPADM

## 2021-01-25 RX ORDER — ONDANSETRON 2 MG/ML
INJECTION INTRAMUSCULAR; INTRAVENOUS PRN
Status: DISCONTINUED | OUTPATIENT
Start: 2021-01-25 | End: 2021-01-25

## 2021-01-25 RX ORDER — SODIUM CHLORIDE, SODIUM LACTATE, POTASSIUM CHLORIDE, CALCIUM CHLORIDE 600; 310; 30; 20 MG/100ML; MG/100ML; MG/100ML; MG/100ML
INJECTION, SOLUTION INTRAVENOUS CONTINUOUS PRN
Status: DISCONTINUED | OUTPATIENT
Start: 2021-01-25 | End: 2021-01-25

## 2021-01-25 RX ORDER — CEFAZOLIN SODIUM 1 G/3ML
1 INJECTION, POWDER, FOR SOLUTION INTRAMUSCULAR; INTRAVENOUS SEE ADMIN INSTRUCTIONS
Status: DISCONTINUED | OUTPATIENT
Start: 2021-01-25 | End: 2021-01-25 | Stop reason: HOSPADM

## 2021-01-25 RX ORDER — ONDANSETRON 4 MG/1
4 TABLET, ORALLY DISINTEGRATING ORAL
Status: DISCONTINUED | OUTPATIENT
Start: 2021-01-25 | End: 2021-01-25 | Stop reason: HOSPADM

## 2021-01-25 RX ORDER — CEFAZOLIN SODIUM 2 G/100ML
2 INJECTION, SOLUTION INTRAVENOUS
Status: COMPLETED | OUTPATIENT
Start: 2021-01-25 | End: 2021-01-25

## 2021-01-25 RX ADMIN — FENTANYL CITRATE 100 MCG: 50 INJECTION, SOLUTION INTRAMUSCULAR; INTRAVENOUS at 12:29

## 2021-01-25 RX ADMIN — LIDOCAINE HYDROCHLORIDE 100 MG: 20 INJECTION, SOLUTION INFILTRATION; PERINEURAL at 12:29

## 2021-01-25 RX ADMIN — HYDROMORPHONE HYDROCHLORIDE 0.5 MG: 1 INJECTION, SOLUTION INTRAMUSCULAR; INTRAVENOUS; SUBCUTANEOUS at 12:46

## 2021-01-25 RX ADMIN — SODIUM CHLORIDE, POTASSIUM CHLORIDE, SODIUM LACTATE AND CALCIUM CHLORIDE: 600; 310; 30; 20 INJECTION, SOLUTION INTRAVENOUS at 13:29

## 2021-01-25 RX ADMIN — SODIUM CHLORIDE, POTASSIUM CHLORIDE, SODIUM LACTATE AND CALCIUM CHLORIDE: 600; 310; 30; 20 INJECTION, SOLUTION INTRAVENOUS at 12:20

## 2021-01-25 RX ADMIN — DEXAMETHASONE SODIUM PHOSPHATE 4 MG: 4 INJECTION, SOLUTION INTRA-ARTICULAR; INTRALESIONAL; INTRAMUSCULAR; INTRAVENOUS; SOFT TISSUE at 12:39

## 2021-01-25 RX ADMIN — CEFAZOLIN SODIUM 2 G: 2 INJECTION, SOLUTION INTRAVENOUS at 12:20

## 2021-01-25 RX ADMIN — HYDROMORPHONE HYDROCHLORIDE 0.5 MG: 1 INJECTION, SOLUTION INTRAMUSCULAR; INTRAVENOUS; SUBCUTANEOUS at 12:58

## 2021-01-25 RX ADMIN — MIDAZOLAM 2 MG: 1 INJECTION INTRAMUSCULAR; INTRAVENOUS at 12:20

## 2021-01-25 RX ADMIN — DEXMEDETOMIDINE HYDROCHLORIDE 0.5 MCG/KG/HR: 100 INJECTION, SOLUTION INTRAVENOUS at 12:32

## 2021-01-25 RX ADMIN — HYDROCODONE BITARTRATE AND ACETAMINOPHEN 2 TABLET: 5; 325 TABLET ORAL at 14:24

## 2021-01-25 RX ADMIN — PROPOFOL 200 MG: 10 INJECTION, EMULSION INTRAVENOUS at 12:29

## 2021-01-25 RX ADMIN — PROPOFOL 50 MCG/KG/MIN: 10 INJECTION, EMULSION INTRAVENOUS at 12:31

## 2021-01-25 RX ADMIN — ONDANSETRON 4 MG: 2 INJECTION INTRAMUSCULAR; INTRAVENOUS at 13:29

## 2021-01-25 RX ADMIN — PROPOFOL 400 MG: 10 INJECTION, EMULSION INTRAVENOUS at 12:30

## 2021-01-25 ASSESSMENT — LIFESTYLE VARIABLES: TOBACCO_USE: 0

## 2021-01-25 ASSESSMENT — MIFFLIN-ST. JEOR: SCORE: 1906.41

## 2021-01-25 NOTE — ANESTHESIA POSTPROCEDURE EVALUATION
Patient: Anthony Chappell    Procedure(s):  RIGHT HYDROCELECTOMY    Diagnosis:Hydrocele, unspecified [N43.3]  Diagnosis Additional Information: No value filed.    Anesthesia Type:  General    Note:  Anesthesia Post Evaluation    Last vitals:  Vitals:    01/25/21 1415 01/25/21 1430 01/25/21 1445   BP: (!) 130/92 (!) 131/96 (!) 136/99   Pulse: 68 53 60   Resp: 16 12 16   Temp: 36.2  C (97.2  F) 36.2  C (97.1  F) 36.2  C (97.1  F)   SpO2: 96% 97% 96%       Electronically Signed By: Joe Betancur MD  January 25, 2021  3:11 PM

## 2021-01-25 NOTE — ANESTHESIA PREPROCEDURE EVALUATION
Anesthesia Pre-Procedure Evaluation    Patient: Anthony Chappell   MRN: [id number] : 1975        Preoperative Diagnosis: Hydrocele, unspecified [N43.3]   Procedure : Procedure(s):  LEFT HYDROCELECTOMY     Past Medical History:   Diagnosis Date     Arthrogryposis multiplex congenita     Underwent multiple orthopedic surgical procedures from birth to age 8     HTN (hypertension)     started lisinopril 21      Past Surgical History:   Procedure Laterality Date     CA ANESTH,LOWER ARM SURGERY Bilateral      LEG SURGERY Bilateral       No Known Allergies   Social History     Tobacco Use     Smoking status: Never Smoker     Smokeless tobacco: Never Used   Substance Use Topics     Alcohol use: Not Currently      Wt Readings from Last 1 Encounters:   21 98.4 kg (217 lb)        Anesthesia Evaluation   Pt has had prior anesthetic.     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     Infectious Disease:       Malignancy:       Other: Comment: Heroin use. Last use in 2020           Physical Exam    Airway        Mallampati: II   TM distance: > 3 FB   Neck ROM: full   Mouth opening: > 3 cm    Respiratory Devices and Support         Dental  no notable dental history         Cardiovascular   cardiovascular exam normal          Pulmonary   pulmonary exam normal                OUTSIDE LABS:  CBC:   Lab Results   Component Value Date    WBC 8.8 2021    WBC 4.5 2020    HGB 14.4 2021    HGB 12.2 (L) 2020    HCT 45.1 2021     HCT 39.3 (L) 11/05/2020     01/18/2021     11/05/2020     BMP:   Lab Results   Component Value Date     01/18/2021     11/05/2020    POTASSIUM 3.6 01/18/2021    POTASSIUM 3.4 11/05/2020    CHLORIDE 108 01/18/2021    CHLORIDE 106 11/05/2020    CO2 26 01/18/2021    CO2 27 11/05/2020    BUN 7 01/18/2021    BUN 9 11/05/2020    CR 0.92 01/18/2021    CR 0.61 (L) 11/05/2020     (H) 01/18/2021     (H) 11/05/2020     COAGS: No results found for: PTT, INR, FIBR  POC: No results found for: BGM, HCG, HCGS  HEPATIC:   Lab Results   Component Value Date    ALBUMIN 3.9 10/19/2020    PROTTOTAL 7.9 10/19/2020    ALT 30 10/19/2020    AST 24 10/19/2020    ALKPHOS 78 10/19/2020    BILITOTAL 0.3 10/19/2020     OTHER:   Lab Results   Component Value Date    BEHZAD 9.2 01/18/2021       Anesthesia Plan     History & Physical Review      ASA Status:  2. NPO Status:  NPO Appropriate. .  Plan for General with Intravenous and Propofol induction. Device: LMA Maintenance will be Balanced.         PONV prophylaxis:  Ondansetron (or other 5HT-3) and Dexamethasone or Solumedrol.       Consents  Anesthesia Plan(s) and associated risks, benefits, and realistic alternatives discussed.    Questions answered and patient/representative(s) expressed understanding.    Discussed with:  Patient.             Postoperative Care  Postoperative pain management: Multi-modal analgesia.           Joe Betancur MD

## 2021-01-25 NOTE — ANESTHESIA POSTPROCEDURE EVALUATION
Patient: Anthony Chappell    Procedure(s):  RIGHT HYDROCELECTOMY    Diagnosis:Hydrocele, unspecified [N43.3]  Diagnosis Additional Information: No value filed.    Anesthesia Type:  General    Note:  Disposition: Outpatient   Postop Pain Control: Uneventful            Sign Out: Well controlled pain   PONV: No   Neuro/Psych: Uneventful            Sign Out: Acceptable/Baseline neuro status   Airway/Respiratory: Uneventful            Sign Out: Acceptable/Baseline resp. status   CV/Hemodynamics: Uneventful            Sign Out: Acceptable CV status   Other NRE: NONE   DID A NON-ROUTINE EVENT OCCUR? No         Last vitals:  Vitals:    01/25/21 1351 01/25/21 1400 01/25/21 1415   BP: 98/59 103/78 (!) 130/92   Pulse: 62 62 68   Resp: 14 13 16   Temp: 36.1  C (96.9  F) 36.1  C (96.9  F) 36.2  C (97.2  F)   SpO2: 100% 99% 96%       Electronically Signed By: Joe Betancur MD  January 25, 2021  2:25 PM

## 2021-01-25 NOTE — OP NOTE
OPERATIVE REPORT    PATIENT: Anthony Chappell  : 1975, AGE: 45 year old  SSN: [id number]    SURGEON  Eros Canchola MD    Circulator: Pb Bernal RN  Relief Circulator: Porsche Delgado RN; Rick Barnes RN  Relief Scrub: Jorge Justin  Scrub Person: Waleska Cortez    PREOP DIAGNOSIS:  Right Hydrocele    POSTOP DIAGNOSIS: Same    Procedure(s):  RIGHT HYDROCELECTOMY    ANESTHESIA  General    COMPLICATIONS:   None    FINDINGS   800 cc of serous fluid was drained from the right hydrocele.  Redundant sac excised and oversewn in bottleneck fashion.    SPECIMEN  1. Hydrocele sac    IMPLANT   1. None    EBL 25 cc    TECHNIQUE  After informed consent was obtained within the preoperative care unit the patient was transferred to the operative theater in stable condition.  There he was transferred from his [facility] to the operative table and placed in supine position.  Bilateral lower extremity sequential compression devices were applied and perioperative antibiotic prophylaxis was undertaken with cefazolin.  After proper induction of general anesthesia the patient's genitalia was prepped and draped in the usual sterile fashion utilizing Betadine.  At this point a surgical timeout was performed with all those in attendance agreement correct patient, procedure, and laterality.    We began by anesthetizing the skin overlying the median scrotal raphae with 0.25% bupivacaine.  15 blade was then used to carry out approximately 10 cm incision.  Bovie electrocautery was then used to dissect down to the hydrocele sac which was then freed from its surrounding tissue with a combination of blunt dissection and electrocautery.  Once freed I was able to deliver the hydrocele through the scrotal incision a small incision was then made within the hydrocele sac and a total of 800 cc of serous fluid was drained.  The electrocautery was used to open up the hydrocele sac back to its origin surrounding  the spermatic cord.  Care was taken to ensure that no cord structures or the testicle were injured during the process.  There was extensive amount of redundant hydrocele sac tissue so therefore this was excised with the Bovie electrocautery.  The edge of the hydrocele sac was then copiously fulgurated and then oversewn in a bottleneck fashion using a 3-0 Vicryl suture.  Wound was then copiously irrigated and all bleeding vessels and potentially bleeding vessels were fulgurated with the cautery.  One final evaluation noted no additional bleeding therefore the testicle was returned to the scrotal sac.    The dartos tissue was then closed in a total of 3 layers using a 3-0 Vicryl suture scrotal skin was then closed in a running horizontal mattress fashion with a 4-0 Monocryl suture.  The incision was then again anesthetized with the bupivacaine as well as performing a right sided cord block.  The skin incision was then cleaned of all its prep solution and surgical glue was applied.  After the glue had dried scrotal fluffs and a scrotal supporter were applied as a pressure dressing.  This marked the end of procedure which the patient tolerated well and without complication.  Estimated blood loss was 25 mL and all surgical counts were correct at its conclusion.    The patient was cleaned and then transferred back to his hospital [facility] before being awoken from general anesthesia and discharged to the postanesthesia care unit in stable condition.    PLAN  Patient will be discharged to home and he will follow-up with me in approximately 2 to 4 weeks.    Eros Canchola MD  [facility]  Pager: [phone]  Office: [phone]  Surgical [phone]

## 2021-01-25 NOTE — ANESTHESIA CARE TRANSFER NOTE
Patient: Anthony Chappell    Procedure(s):  RIGHT HYDROCELECTOMY    Diagnosis: Hydrocele, unspecified [N43.3]  Diagnosis Additional Information: No value filed.    Anesthesia Type:   General     Note:    Oropharynx: oral airway in place and spontaneously breathing  Level of Consciousness: drowsy  Oxygen Supplementation: face mask  Level of Supplemental Oxygen: 8  Independent Airway: airway patency satisfactory and stable  Dentition: dentition unchanged  Vital Signs Stable: post-procedure vital signs reviewed and stable  Report to RN Given: handoff report given  Patient transferred to: PACU    Handoff Report: Identifed the Patient, Identified the Reponsible Provider, Reviewed the pertinent medical history, Discussed the surgical course, Reviewed Intra-OP anesthesia mangement and issues during anesthesia, Set expectations for post-procedure period and Allowed opportunity for questions and acknowledgement of understanding      Vitals: (Last set prior to Anesthesia Care Transfer)  CRNA VITALS  1/25/2021 1320 - 1/25/2021 1358      1/25/2021             Resp Rate (set):  10        Electronically Signed By: LIZANDRO Chen CRNA  January 25, 2021  1:58 PM

## 2021-01-25 NOTE — ANESTHESIA PROCEDURE NOTES
Airway   Date/Time: 1/25/2021 12:29 PM   Patient location during procedure: OR  Staff -   Anesthesiologist:  Joe Betancur MD  CRNA: Celina Matthew APRN CRNA  Other Anesthesia Staff: Jeffery Bradley  Performed By: SRNA    Consent for Airway   Urgency: elective    Indications and Patient Condition  Indications for airway management: allen-procedural  Induction type:intravenousMask difficulty assessment: 3 - difficult mask (inadequate, unstable, or two providers) +/- NMBA    Final Airway Details  Final airway type: supraglottic airway    Endotracheal Airway Details   Secured with: pink tape    Post intubation assessment   Placement verified by: capnometry and chest rise   Number of attempts at approach: 2  Secured with:pink tape  Ease of procedure: easy  Dentition: Intact

## 2021-01-25 NOTE — TELEPHONE ENCOUNTER
Sushant's nurse returning call. Patient will report for surgery and anesthesiologist will decide if they can proceed.

## 2021-01-25 NOTE — OR NURSING
Awoken w gentle auditory and tactile stimuli- follows basic commands- oral; airway removed- denies SOB, resp distress or other c/o- O2 sats 100% w face mask @ 8 ltres.

## 2021-10-11 ENCOUNTER — HEALTH MAINTENANCE LETTER (OUTPATIENT)
Age: 46
End: 2021-10-11

## 2022-01-30 ENCOUNTER — HEALTH MAINTENANCE LETTER (OUTPATIENT)
Age: 47
End: 2022-01-30

## 2022-09-24 ENCOUNTER — HEALTH MAINTENANCE LETTER (OUTPATIENT)
Age: 47
End: 2022-09-24

## 2023-02-04 NOTE — DISCHARGE INSTRUCTIONS
Same Day Surgery Discharge Instructions for  Sedation and General Anesthesia       It's not unusual to feel dizzy, light-headed or faint for up to 24 hours after surgery or while taking pain medication.  If you have these symptoms: sit for a few minutes before standing and have someone assist you when you get up to walk or use the bathroom.      You should rest and relax for the next 24 hours. We recommend you make arrangements to have an adult stay with you for at least 24 hours after your discharge.  Avoid hazardous and strenuous activity.      DO NOT DRIVE any vehicle or operate mechanical equipment for 24 hours following the end of your surgery.  Even though you may feel normal, your reactions may be affected by the medication you have received.      Do not drink alcoholic beverages for 24 hours following surgery.       Slowly progress to your regular diet as you feel able. It's not unusual to feel nauseated and/or vomit after receiving anesthesia.  If you develop these symptoms, drink clear liquids (apple juice, ginger ale, broth, 7-up, etc. ) until you feel better.  If your nausea and vomiting persists for 24 hours, please notify your surgeon.        All narcotic pain medications, along with inactivity and anesthesia, can cause constipation. Drinking plenty of liquids and increasing fiber intake will help.      For any questions of a medical nature, call your surgeon.      Do not make important decisions for 24 hours.      If you had general anesthesia, you may have a sore throat for a couple of days related to the breathing tube used during surgery.  You may use Cepacol lozenges to help with this discomfort.  If it worsens or if you develop a fever, contact your surgeon.       If you feel your pain is not well managed with the pain medications prescribed by your surgeon, please contact your surgeon's office to let them know so they can address your concerns.       CoVid 19 Information    We want to give you  information regarding Covid. Please consult your primary care provider with any questions you might have.     Patient who have symptoms (cough, fever, or shortness of breath), need to isolate for 7 days from when symptoms started OR 72 hours after fever resolves (without fever reducing medications) AND improvement of respiratory symptoms (whichever is longer).      Isolate yourself at home (in own room/own bathroom if possible)    Do Not allow any visitors    Do Not go to work or school    Do Not go to [employer],  centers, shopping, or other public places.    Do Not shake hands.    Avoid close and intimate contact with others (hugging, kissing).    Follow CDC recommendations for household cleaning of frequently touched services.     After the initial 7 days, continue to isolate yourself from household members as much as possible. To continue decrease the risk of community spread and exposure, you and any members of your household should limit activities in public for 14 days after starting home isolation.     You can reference the following CDC link for helpful home isolation/care tips:  https://www.cdc.gov/coronavirus/2019-ncov/downloads/10Things.pdf    Protect Others:    Cover Your Mouth and Nose with a mask, disposable tissue or wash cloth to avoid spreading germs to others.    Wash your hands and face frequently with soap and water    Call Your Primary Doctor If: Breathing difficulty develops or you become worse.    For more information about COVID19 and options for caring for yourself at home, please visit the CDC website at https://www.cdc.gov/coronavirus/2019-ncov/about/steps-when-sick.html  For more options for care at [facility], please visit our website at [email or web address]        Discharge Instructions for Hydrocele/Spermatocele     During your recovery:  To help reduce swelling, apply an ice pack or cold compress to the scrotum as directed. Do this for no  longer than 15 minutes at a time. Continue using the cold pack for 2 days or until swelling improves.  Take prescribed pain medications as directed.  Care for your incision as instructed.  Follow your surgeon s guidelines for showering. Avoid swimming, tub baths, using a hot tub, and other activities that cause the incision to be covered with water until your doctor says it s okay.  Wear a jockstrap or snug underwear as directed.  Avoid heavy lifting and strenuous exercise for 1 week or  as directed by surgeon.  Sexual intercourse may be resumed after your follow up appointment or as directed by surgeon  Do not drive until you are no longer taking pain medication and your doctor says it s okay.  You may have a drain to reduce swelling and infection.  The surgeon will remove it in the office.  Call Your Surgeon If You Have Any of the Following:  Chest pain or trouble breathing (call 911)  Fever of 100.4 F or higher  Symptoms of infection at the incision site such as increased redness or swelling, warmth, worsening pain, or foul-smelling drainage  Bleeding from the incision site  Pain gets worse or is not relieved by pain medications  Increased pain or swelling in the scrotum or groin area   Follow-Up  Make a follow up appointment with your surgeon as directed. You may also have sutures that need to be removed. Call your surgeon if you have any questions or concerns about your recovery.      Liquid Adhesive (Dermabond or Exofin)  General Care:    Keep the wound clean and dry however, shower or bathe as instructed by your surgeon.  Do not use soaps, lotions, or ointments on the wound area. Do not scrub the wound. After bathing, pat the wound dry with a soft towel.    Do not scratch, rub, or pick at the film. Do not place tape directly over the  film.    Do not apply liquids (such as peroxide), ointments, or creams to the wound while the strips or film are in place.    Most  wounds heal without problems. However, an  infection sometimes occurs despite proper treatment. Therefore, watch for the signs of infection listed below.  FOLLOW UP as directed by the doctor or our staff. The  film will fall off naturally in 5 to 10 days.  CONTACT YOU SURGEON if any of the following occur:    Signs of infection:    Fever of 100.4 F (38 C) or higher, or as directed by your healthcare provider    Increasing pain in the wound    Increasing redness or swelling    Pus coming from the wound    Wound bleeds more than a small amount or bleeding doesn t stop    Wound edges come apart                **If you have questions or concerns about your procedure,  call Dr. Canchola at [phone]**   Patient

## 2023-05-08 ENCOUNTER — HEALTH MAINTENANCE LETTER (OUTPATIENT)
Age: 48
End: 2023-05-08

## (undated) DEVICE — PACK MINOR SBA15MIFSE

## (undated) DEVICE — SUCTION TIP YANKAUER STR K87

## (undated) DEVICE — GLOVE PROTEXIS MICRO 7.5  2D73PM75

## (undated) DEVICE — PREP SKIN SCRUB TRAY 4461A

## (undated) DEVICE — SU MONOCRYL 4-0 PS-2 18" UND Y496G

## (undated) DEVICE — DRSG KERLIX FLUFFS X5

## (undated) DEVICE — SUCTION CANISTER MEDIVAC LINER 3000ML W/LID 65651-530

## (undated) DEVICE — SU VICRYL 3-0 SH 27" J316H

## (undated) DEVICE — DRAPE LAP W/ARMBOARD 29410

## (undated) DEVICE — SUPPORTER ATHLETIC LG LATEX 202636

## (undated) DEVICE — ADH SKIN CLOSURE PREMIERPRO EXOFIN 1.0ML 3470

## (undated) DEVICE — SOL WATER IRRIG 1000ML BOTTLE 2F7114

## (undated) DEVICE — GLOVE PROTEXIS BLUE W/NEU-THERA 8.0  2D73EB80

## (undated) DEVICE — ESU PENCIL W/SMOKE EVAC CVPLP2000

## (undated) DEVICE — LINEN TOWEL PACK X5 5464

## (undated) RX ORDER — HYDROCODONE BITARTRATE AND ACETAMINOPHEN 5; 325 MG/1; MG/1
TABLET ORAL
Status: DISPENSED
Start: 2021-01-25

## (undated) RX ORDER — PROPOFOL 10 MG/ML
INJECTION, EMULSION INTRAVENOUS
Status: DISPENSED
Start: 2021-01-25

## (undated) RX ORDER — HYDROMORPHONE HYDROCHLORIDE 1 MG/ML
INJECTION, SOLUTION INTRAMUSCULAR; INTRAVENOUS; SUBCUTANEOUS
Status: DISPENSED
Start: 2021-01-25

## (undated) RX ORDER — LIDOCAINE HYDROCHLORIDE 20 MG/ML
INJECTION, SOLUTION EPIDURAL; INFILTRATION; INTRACAUDAL; PERINEURAL
Status: DISPENSED
Start: 2021-01-25

## (undated) RX ORDER — CEFAZOLIN SODIUM 2 G/100ML
INJECTION, SOLUTION INTRAVENOUS
Status: DISPENSED
Start: 2021-01-25

## (undated) RX ORDER — FENTANYL CITRATE 50 UG/ML
INJECTION, SOLUTION INTRAMUSCULAR; INTRAVENOUS
Status: DISPENSED
Start: 2021-01-25

## (undated) RX ORDER — ONDANSETRON 2 MG/ML
INJECTION INTRAMUSCULAR; INTRAVENOUS
Status: DISPENSED
Start: 2021-01-25

## (undated) RX ORDER — DEXAMETHASONE SODIUM PHOSPHATE 4 MG/ML
INJECTION, SOLUTION INTRA-ARTICULAR; INTRALESIONAL; INTRAMUSCULAR; INTRAVENOUS; SOFT TISSUE
Status: DISPENSED
Start: 2021-01-25